# Patient Record
Sex: FEMALE | Race: WHITE | NOT HISPANIC OR LATINO | Employment: OTHER | URBAN - METROPOLITAN AREA
[De-identification: names, ages, dates, MRNs, and addresses within clinical notes are randomized per-mention and may not be internally consistent; named-entity substitution may affect disease eponyms.]

---

## 2017-01-07 ENCOUNTER — OFFICE VISIT - HEALTHEAST (OUTPATIENT)
Dept: FAMILY MEDICINE | Facility: CLINIC | Age: 67
End: 2017-01-07

## 2017-01-07 DIAGNOSIS — R09.82 POST-NASAL DRAINAGE: ICD-10-CM

## 2017-04-17 ENCOUNTER — RECORDS - HEALTHEAST (OUTPATIENT)
Dept: ADMINISTRATIVE | Facility: OTHER | Age: 67
End: 2017-04-17

## 2017-04-19 ENCOUNTER — OFFICE VISIT - HEALTHEAST (OUTPATIENT)
Dept: FAMILY MEDICINE | Facility: CLINIC | Age: 67
End: 2017-04-19

## 2017-04-19 ENCOUNTER — COMMUNICATION - HEALTHEAST (OUTPATIENT)
Dept: TELEHEALTH | Facility: CLINIC | Age: 67
End: 2017-04-19

## 2017-04-19 DIAGNOSIS — Z00.00 PREVENTATIVE HEALTH CARE: ICD-10-CM

## 2017-04-19 DIAGNOSIS — H81.10 BPPV (BENIGN PAROXYSMAL POSITIONAL VERTIGO), UNSPECIFIED LATERALITY: ICD-10-CM

## 2017-04-19 ASSESSMENT — MIFFLIN-ST. JEOR: SCORE: 1276.32

## 2017-04-19 NOTE — ASSESSMENT & PLAN NOTE
BPPV   Not currently feeling dizzy.  Recommend PT  Dizziness - Vestibular Therapist  Didier PT, DPT, NCS  Physical Therapist [facility]   Appointements: [phone] [address] [facility] [address]

## 2017-05-11 ENCOUNTER — COMMUNICATION - HEALTHEAST (OUTPATIENT)
Dept: FAMILY MEDICINE | Facility: CLINIC | Age: 67
End: 2017-05-11

## 2017-12-22 ENCOUNTER — RECORDS - HEALTHEAST (OUTPATIENT)
Dept: ADMINISTRATIVE | Facility: OTHER | Age: 67
End: 2017-12-22

## 2018-03-02 ENCOUNTER — OFFICE VISIT - HEALTHEAST (OUTPATIENT)
Dept: FAMILY MEDICINE | Facility: CLINIC | Age: 68
End: 2018-03-02

## 2018-03-02 DIAGNOSIS — N63.10 BREAST MASS, RIGHT: ICD-10-CM

## 2018-03-02 DIAGNOSIS — Z12.31 ENCOUNTER FOR SCREENING MAMMOGRAM FOR BREAST CANCER: ICD-10-CM

## 2018-03-02 DIAGNOSIS — Z12.4 SCREENING FOR CERVICAL CANCER: ICD-10-CM

## 2018-03-02 DIAGNOSIS — Z00.00 HEALTHCARE MAINTENANCE: ICD-10-CM

## 2018-03-02 DIAGNOSIS — Z91.89 AT HIGH RISK FOR OSTEOPOROSIS: ICD-10-CM

## 2018-03-02 LAB
ALBUMIN SERPL-MCNC: 3.8 G/DL (ref 3.5–5)
ALP SERPL-CCNC: 64 U/L (ref 45–120)
ALT SERPL W P-5'-P-CCNC: 19 U/L (ref 0–45)
ANION GAP SERPL CALCULATED.3IONS-SCNC: 9 MMOL/L (ref 5–18)
AST SERPL W P-5'-P-CCNC: 24 U/L (ref 0–40)
BILIRUB SERPL-MCNC: 0.6 MG/DL (ref 0–1)
BUN SERPL-MCNC: 16 MG/DL (ref 8–22)
CALCIUM SERPL-MCNC: 9.7 MG/DL (ref 8.5–10.5)
CHLORIDE BLD-SCNC: 105 MMOL/L (ref 98–107)
CHOLEST SERPL-MCNC: 204 MG/DL
CO2 SERPL-SCNC: 26 MMOL/L (ref 22–31)
CREAT SERPL-MCNC: 0.76 MG/DL (ref 0.6–1.1)
FASTING STATUS PATIENT QL REPORTED: YES
GFR SERPL CREATININE-BSD FRML MDRD: >60 ML/MIN/1.73M2
GLUCOSE BLD-MCNC: 88 MG/DL (ref 70–125)
HDLC SERPL-MCNC: 76 MG/DL
LDLC SERPL CALC-MCNC: 115 MG/DL
POTASSIUM BLD-SCNC: 4.1 MMOL/L (ref 3.5–5)
PROT SERPL-MCNC: 6.8 G/DL (ref 6–8)
SODIUM SERPL-SCNC: 140 MMOL/L (ref 136–145)
TRIGL SERPL-MCNC: 67 MG/DL
TSH SERPL DL<=0.005 MIU/L-ACNC: 2.14 UIU/ML (ref 0.3–5)

## 2018-03-02 ASSESSMENT — MIFFLIN-ST. JEOR: SCORE: 1262.71

## 2018-03-03 LAB — HCV AB SERPL QL IA: NEGATIVE

## 2018-03-05 LAB
25(OH)D3 SERPL-MCNC: 33.6 NG/ML (ref 30–80)
HPV SOURCE: NORMAL
HUMAN PAPILLOMA VIRUS 16 DNA: NEGATIVE
HUMAN PAPILLOMA VIRUS 18 DNA: NEGATIVE
HUMAN PAPILLOMA VIRUS FINAL DIAGNOSIS: NORMAL
HUMAN PAPILLOMA VIRUS OTHER HR: NEGATIVE
SPECIMEN DESCRIPTION: NORMAL

## 2018-03-06 ENCOUNTER — COMMUNICATION - HEALTHEAST (OUTPATIENT)
Dept: FAMILY MEDICINE | Facility: CLINIC | Age: 68
End: 2018-03-06

## 2018-03-13 LAB
BKR LAB AP ABNORMAL BLEEDING: NO
BKR LAB AP BIRTH CONTROL/HORMONES: NORMAL
BKR LAB AP CERVICAL APPEARANCE: NORMAL
BKR LAB AP GYN ADEQUACY: NORMAL
BKR LAB AP GYN INTERPRETATION: NORMAL
BKR LAB AP HPV REFLEX: NORMAL
BKR LAB AP LMP: NORMAL
BKR LAB AP PATIENT STATUS: NORMAL
BKR LAB AP PREVIOUS ABNORMAL: NORMAL
BKR LAB AP PREVIOUS NORMAL: 2012
HIGH RISK?: NO
PATH REPORT.COMMENTS IMP SPEC: NORMAL
RESULT FLAG (HE HISTORICAL CONVERSION): NORMAL

## 2018-06-13 ENCOUNTER — HOSPITAL ENCOUNTER (OUTPATIENT)
Dept: ULTRASOUND IMAGING | Facility: CLINIC | Age: 68
Discharge: HOME | End: 2018-06-13
Attending: FAMILY MEDICINE

## 2018-06-13 ENCOUNTER — HOSPITAL ENCOUNTER (OUTPATIENT)
Dept: MAMMOGRAPHY | Facility: CLINIC | Age: 68
Discharge: HOME | End: 2018-06-13
Attending: FAMILY MEDICINE

## 2018-06-13 DIAGNOSIS — N63.10 BREAST MASS, RIGHT: ICD-10-CM

## 2018-06-13 DIAGNOSIS — Z12.31 ENCOUNTER FOR SCREENING MAMMOGRAM FOR BREAST CANCER: ICD-10-CM

## 2018-06-13 DIAGNOSIS — N60.01 SOLITARY CYST OF RIGHT BREAST: ICD-10-CM

## 2019-04-12 ENCOUNTER — OFFICE VISIT - HEALTHEAST (OUTPATIENT)
Dept: FAMILY MEDICINE | Facility: CLINIC | Age: 69
End: 2019-04-12

## 2019-04-12 DIAGNOSIS — J00 NASOPHARYNGITIS ACUTE: ICD-10-CM

## 2019-04-12 RX ORDER — IBUPROFEN 200 MG
200 TABLET ORAL EVERY 6 HOURS PRN
Status: SHIPPED | COMMUNITY
Start: 2019-04-12

## 2019-05-01 ENCOUNTER — COMMUNICATION - HEALTHEAST (OUTPATIENT)
Dept: FAMILY MEDICINE | Facility: CLINIC | Age: 69
End: 2019-05-01

## 2019-07-22 ENCOUNTER — OFFICE VISIT - HEALTHEAST (OUTPATIENT)
Dept: FAMILY MEDICINE | Facility: CLINIC | Age: 69
End: 2019-07-22

## 2019-07-22 ENCOUNTER — HOSPITAL ENCOUNTER (OUTPATIENT)
Dept: MAMMOGRAPHY | Facility: CLINIC | Age: 69
Discharge: HOME | End: 2019-07-22
Attending: FAMILY MEDICINE

## 2019-07-22 DIAGNOSIS — R21 RASH AND NONSPECIFIC SKIN ERUPTION: ICD-10-CM

## 2019-07-22 DIAGNOSIS — Z12.31 VISIT FOR SCREENING MAMMOGRAM: ICD-10-CM

## 2019-07-22 RX ORDER — BETAMETHASONE DIPROPIONATE 0.5 MG/G
CREAM TOPICAL 2 TIMES DAILY
Qty: 30 G | Refills: 0 | Status: SHIPPED | OUTPATIENT
Start: 2019-07-22 | End: 2021-07-30

## 2019-09-03 ENCOUNTER — OFFICE VISIT - HEALTHEAST (OUTPATIENT)
Dept: FAMILY MEDICINE | Facility: CLINIC | Age: 69
End: 2019-09-03

## 2019-09-03 DIAGNOSIS — L43.9 LICHEN PLANUS: ICD-10-CM

## 2019-09-03 DIAGNOSIS — N81.11 CYSTOCELE, MIDLINE: ICD-10-CM

## 2019-09-03 DIAGNOSIS — N81.6 RECTOCELE: ICD-10-CM

## 2019-09-03 ASSESSMENT — MIFFLIN-ST. JEOR: SCORE: 1185.6

## 2019-09-26 ENCOUNTER — OFFICE VISIT - HEALTHEAST (OUTPATIENT)
Dept: FAMILY MEDICINE | Facility: CLINIC | Age: 69
End: 2019-09-26

## 2019-09-26 ENCOUNTER — AMBULATORY - HEALTHEAST (OUTPATIENT)
Dept: FAMILY MEDICINE | Facility: CLINIC | Age: 69
End: 2019-09-26

## 2019-09-26 DIAGNOSIS — Z00.00 HEALTHCARE MAINTENANCE: ICD-10-CM

## 2019-09-26 DIAGNOSIS — Z23 ENCOUNTER FOR IMMUNIZATION: ICD-10-CM

## 2019-09-26 DIAGNOSIS — Z78.0 ASYMPTOMATIC MENOPAUSAL STATE: ICD-10-CM

## 2019-09-26 DIAGNOSIS — Z60.4 SOCIAL ISOLATION: ICD-10-CM

## 2019-09-26 DIAGNOSIS — Z00.00 ROUTINE HEALTH MAINTENANCE: ICD-10-CM

## 2019-09-26 DIAGNOSIS — N81.9 FEMALE GENITAL PROLAPSE, UNSPECIFIED TYPE: ICD-10-CM

## 2019-09-26 DIAGNOSIS — E78.2 MIXED HYPERLIPIDEMIA: ICD-10-CM

## 2019-09-26 DIAGNOSIS — Z13.820 SCREENING FOR OSTEOPOROSIS: ICD-10-CM

## 2019-09-26 LAB
ALBUMIN SERPL-MCNC: 3.8 G/DL (ref 3.5–5)
ALP SERPL-CCNC: 62 U/L (ref 45–120)
ALT SERPL W P-5'-P-CCNC: 23 U/L (ref 0–45)
ANION GAP SERPL CALCULATED.3IONS-SCNC: 6 MMOL/L (ref 5–18)
AST SERPL W P-5'-P-CCNC: 22 U/L (ref 0–40)
BILIRUB SERPL-MCNC: 0.5 MG/DL (ref 0–1)
BUN SERPL-MCNC: 21 MG/DL (ref 8–28)
CALCIUM SERPL-MCNC: 9.7 MG/DL (ref 8.5–10.5)
CHLORIDE BLD-SCNC: 106 MMOL/L (ref 98–107)
CHOLEST SERPL-MCNC: 235 MG/DL
CO2 SERPL-SCNC: 28 MMOL/L (ref 22–31)
CREAT SERPL-MCNC: 0.87 MG/DL (ref 0.6–1.1)
FASTING STATUS PATIENT QL REPORTED: YES
GFR SERPL CREATININE-BSD FRML MDRD: >60 ML/MIN/1.73M2
GLUCOSE BLD-MCNC: 91 MG/DL (ref 70–125)
HDLC SERPL-MCNC: 99 MG/DL
LDLC SERPL CALC-MCNC: 127 MG/DL
POTASSIUM BLD-SCNC: 4.6 MMOL/L (ref 3.5–5)
PROT SERPL-MCNC: 6.6 G/DL (ref 6–8)
SODIUM SERPL-SCNC: 140 MMOL/L (ref 136–145)
TRIGL SERPL-MCNC: 44 MG/DL

## 2019-09-26 RX ORDER — ATORVASTATIN CALCIUM 40 MG/1
40 TABLET, FILM COATED ORAL AT BEDTIME
Qty: 90 TABLET | Refills: 3 | Status: SHIPPED | OUTPATIENT
Start: 2019-09-26 | End: 2021-11-16

## 2019-09-26 SDOH — SOCIAL STABILITY - SOCIAL INSECURITY: SOCIAL EXCLUSION AND REJECTION: Z60.4

## 2019-09-26 ASSESSMENT — MIFFLIN-ST. JEOR: SCORE: 1172.55

## 2019-09-27 ENCOUNTER — COMMUNICATION - HEALTHEAST (OUTPATIENT)
Dept: FAMILY MEDICINE | Facility: CLINIC | Age: 69
End: 2019-09-27

## 2019-09-27 DIAGNOSIS — E78.2 MIXED HYPERLIPIDEMIA: ICD-10-CM

## 2019-09-30 ENCOUNTER — RECORDS - HEALTHEAST (OUTPATIENT)
Dept: ADMINISTRATIVE | Facility: OTHER | Age: 69
End: 2019-09-30

## 2019-11-18 ENCOUNTER — COMMUNICATION - HEALTHEAST (OUTPATIENT)
Dept: FAMILY MEDICINE | Facility: CLINIC | Age: 69
End: 2019-11-18

## 2019-12-02 ENCOUNTER — COMMUNICATION - HEALTHEAST (OUTPATIENT)
Dept: FAMILY MEDICINE | Facility: CLINIC | Age: 69
End: 2019-12-02

## 2020-11-18 ENCOUNTER — RECORDS - HEALTHEAST (OUTPATIENT)
Dept: ADMINISTRATIVE | Facility: OTHER | Age: 70
End: 2020-11-18

## 2021-01-07 ENCOUNTER — HOSPITAL ENCOUNTER (OUTPATIENT)
Dept: MAMMOGRAPHY | Facility: CLINIC | Age: 71
Discharge: HOME | End: 2021-01-07
Attending: FAMILY MEDICINE

## 2021-01-07 DIAGNOSIS — Z12.31 VISIT FOR SCREENING MAMMOGRAM: ICD-10-CM

## 2021-02-04 ENCOUNTER — AMBULATORY - HEALTHEAST (OUTPATIENT)
Dept: OTHER | Facility: CLINIC | Age: 71
End: 2021-02-04

## 2021-02-04 ENCOUNTER — DOCUMENTATION ONLY (OUTPATIENT)
Dept: OTHER | Facility: CLINIC | Age: 71
End: 2021-02-04

## 2021-05-27 NOTE — PATIENT INSTRUCTIONS - HE
1.  The encounter diagnosis is viral nasopharyngitis  2.  Symptomatic treatment would include acetaminophen for fever or pain, guaifenesin and dextromethorphan for cough.  3.  I would expect the infection to resolve in the next week.  4.  Return to the walk-in clinic if symptoms become worse or fail to improve.

## 2021-05-27 NOTE — PROGRESS NOTES
Subjective:      Patient ID: Benita Clifton is a 68 y.o. female.    Chief Complaint:    Cough   This is a new problem. The current episode started 1 to 4 weeks ago. The problem occurs intermittently. The problem has been gradually improving. Associated symptoms include congestion and coughing. She has tried acetaminophen for the symptoms. The treatment provided moderate relief.         No past medical history on file.    No past surgical history on file.    Family History   Problem Relation Age of Onset     Heart disease Mother      Stroke Father      Breast cancer Maternal Aunt        Social History     Tobacco Use     Smoking status: Never Smoker     Smokeless tobacco: Never Used   Substance Use Topics     Alcohol use: No     Drug use: No       Review of Systems   HENT: Positive for congestion.    Respiratory: Positive for cough.    All other systems reviewed and are negative.      Objective:     /65 (Patient Site: Right Arm, Patient Position: Sitting)   Pulse 67   Temp 97.9  F (36.6  C)   Resp 17   Wt 151 lb (68.5 kg)   LMP  (LMP Unknown)   SpO2 97%   Breastfeeding? No   BMI 23.30 kg/m      Physical Exam   Constitutional: She is oriented to person, place, and time. She appears well-developed and well-nourished. No distress.   HENT:   Head: Normocephalic.   Right Ear: External ear normal.   Left Ear: External ear normal.   Nose: Nose normal.   Mouth/Throat: Oropharynx is clear and moist. No oropharyngeal exudate.   Eyes: Pupils are equal, round, and reactive to light. Conjunctivae and EOM are normal. Right eye exhibits no discharge. Left eye exhibits no discharge.   Neck: Normal range of motion. Neck supple.   Cardiovascular: Normal rate, regular rhythm and normal heart sounds.   Pulmonary/Chest: Effort normal and breath sounds normal. No respiratory distress. She has no wheezes. She has no rales.   Lymphadenopathy:     She has no cervical adenopathy.   Neurological: She is alert and oriented  to person, place, and time.   Skin: Skin is warm and dry. No rash noted. She is not diaphoretic.   Psychiatric: She has a normal mood and affect. Her behavior is normal. Judgment and thought content normal.   Nursing note and vitals reviewed.      Assessment:       The encounter diagnosis was Nasopharyngitis acute.    Plan:     1.  The encounter diagnosis is viral nasopharyngitis  2.  Symptomatic treatment would include acetaminophen for fever or pain, guaifenesin and dextromethorphan for cough.  3.  I would expect the infection to resolve in the next week.  4.  Return to the walk-in clinic if symptoms become worse or fail to improve.

## 2021-05-28 NOTE — TELEPHONE ENCOUNTER
Question following Office Visit  When did you see your provider: 4/12/19  What is your question: Patient stated she still has the same symptoms from this visit. Patient denied a fever. Patient stated she still has the nasal congestion and headache. Patient is questioning if she can get an antibiotic because she is still dealing with this cold.    Pharmacy:  [facility]  Okay to leave a detailed message: Yes  [phone]

## 2021-05-30 VITALS — HEIGHT: 68 IN | BODY MASS INDEX: 24.25 KG/M2 | WEIGHT: 160 LBS

## 2021-05-30 VITALS — WEIGHT: 163.1 LBS

## 2021-05-30 NOTE — PATIENT INSTRUCTIONS - HE
1.  Follow-up with your  dermatologist for possible biopsy of these skin lesions.  They may be lichen planus also, so I will be treating this topically with a high potency steroid.  2.  Follow-up if you develop any pus, growing redness, or worsening of your symptoms.

## 2021-05-31 NOTE — PROGRESS NOTES
Assessment/Plan:     Patient presents to clinic with skin lesions already seen by dermatologist with a diagnosis of lichen planus.  It is unclear if patient has lichen planus inside her oral mucosa as well, but has seen an oral surgeon who has already taken biopsies.  I encouraged her to follow-up.  No changes in treatment today.    For her pelvic organ prolapse, patient has an obvious cystocele and rectocele.  I am uncertain if patient has uterine prolapse as well.  Recommended that patient see a surgeon if she is interested in definitive therapy.  Patient would like to think about it.  We also discussed pessaries as an option.  Handout was provided to patient on more information.  Patient was encouraged to return in 1 month for her physical.    Problem List Items Addressed This Visit     None      Visit Diagnoses     Cystocele, midline    -  Primary    Rectocele        Lichen planus              Return to clinic in 1 month for physical.     Total time spent with patient was 15 minutes with greater than 50% spent in face-to-face counseling regarding the above plan.    Subjective:      Benita Clifton is a 69 y.o. female who presents to clinic for uterus concerns.    Patient has been having some mild discomfort in the vaginal area without evidence of urinary tract infection or any burning with urination.  She denies vaginal discharge.  She wonders if she is having a pelvic organ prolapse.  She was pregnant 4 times and did deliver all children vaginally.  Her largest child was 9 pounds 13 ounces.  She is not having any pain with intercourse, no difficulty with urination or incomplete voiding, and no fecal impaction necessitating digital assistance.    She also notes that she has skin lesions appreciated on her back and arms.  She has seen a dermatologist.  It was diagnosed as lichen planus.  She was told to treat with betamethasone but only sparingly.    Patient also has a history of lesions inside her mouth.  " She was told that also is likely lichen planus but had a biopsy who is results have not yet been revealed to her.  She is hesitant about returning to the clinic given the parking situation.      Past Medical History, Family History, and Social History reviewed.     Current Outpatient Medications on File Prior to Visit   Medication Sig Dispense Refill     acetaminophen (TYLENOL ORAL) Take by mouth.       aspirin 81 MG EC tablet Take 1 tablet (81 mg total) by mouth daily. 150 tablet 2     betamethasone, augmented, (DIPROLENE-AF) 0.05 % cream Apply topically 2 (two) times a day. 30 g 0     CALCIUM-MAGNESIUM-ZINC ORAL Take by mouth.       ibuprofen (ADVIL) 200 MG tablet Take 200 mg by mouth every 6 (six) hours as needed for pain.       MULTIVITAMIN ORAL Take by mouth.       No current facility-administered medications on file prior to visit.        Review of systems is as stated in HPI.  The remainder of the 10 system review is otherwise negative.    Objective:     /74   Pulse 78   Ht 5' 7.5\" (1.715 m)   Wt 140 lb (63.5 kg)   LMP  (LMP Unknown)   SpO2 98%   BMI 21.60 kg/m   Body mass index is 21.6 kg/m .    Gen: Alert, NAD, appears stated age, normal hygiene   ENT/mouth: nares clear, septum midline, absent rhinorrhea, white patchy lesions on the buccal mucosa bilaterally  SKIN: erythematous and scaly lesions appreciated on forearms and back in various sized patches  : obvious rectocele and cystocele without irritation or discharge    This note has been dictated using voice recognition software. Any grammatical or context distortions are unintentional and inherent to the the software.     Roz Garcia MD      "

## 2021-06-01 VITALS — HEIGHT: 68 IN | BODY MASS INDEX: 23.79 KG/M2 | WEIGHT: 157 LBS

## 2021-06-01 NOTE — PROGRESS NOTES
Assessment and Plan:   Patient presents to clinic for her annual wellness visit.  We had an extensive discussion about social isolation and I recommended that she look into classes nearby, learning a new skill, volunteering, or getting involved with her Orthodoxy.  She is amenable to this plan.  Her PHQ 2 was within normal limits, I do not suspect clinical depression at this time.  We also discussed pelvic organ prolapse.  She would like to be evaluated by a surgeon to at least discuss options.      USPSTF Recommendations for age 69:  Patient has been counseled on/screened for:  - intimate partner violence and there are no concerns at this time  - a healthful diet and physical activity for CVD disease prevention  - Diabetes and hyperlipidemia: screening was performed.   - Hep C, negative in 2018  - Asprin use: patient chose to discontinue   Sexually transmitted infections: Patient would not like to be screened for chlamydia, gonorrhea, syphilis, HIV, and hepatitis  Colorectal Cancer: Colonoscopy last performed in 2017, due in 2022  Immunizations: up to date except for shingles, influenza, pneumonia which was recommended  Cervical Cancer Screening: patient has been screened for cervical cancer per protocol in 2018, results were negative with negative HPV  Breast Cancer: Last mammogram July 2019  Bone Density: Dexa scan ordered today    The patient's current medical problems were reviewed.    The following health maintenance schedule was reviewed with the patient and provided in printed form in the after visit summary:   Health Maintenance   Topic Date Due     ZOSTER VACCINES (2 of 3) 08/09/2012     PNEUMOCOCCAL IMMUNIZATION 65+ LOW/MEDIUM RISK (1 of 2 - PCV13) 08/13/2013     DXA SCAN  08/13/2013     MEDICARE ANNUAL WELLNESS VISIT  03/02/2019     INFLUENZA VACCINE RULE BASED (1) 08/01/2019     FALL RISK ASSESSMENT  09/26/2020     MAMMOGRAM  07/22/2021     ADVANCE CARE PLANNING  03/02/2023     TD 18+ HE  10/25/2027      COLONOSCOPY  12/22/2027     HEPATITIS C SCREENING  Completed        Subjective:   Chief Complaint: Benita Clifton is an 69 y.o. female here for an Annual Wellness visit.   HPI:    1. Patient feels depressed slightly sometimes. She works part time. She likes to be up doing things. She feels isolated sometimes. She has no close female friends. She wants to volunteer more.  She would like to have close friends where she does not always need to be the one initiating contact.  She worries about losing her  and losing her only support system.  She is interested in getting involved in her Hindu.  2.  Patient has known pelvic organ prolapse.  It does not bother her on a daily basis but it does interfere with intercourse.  She does not notice any irritation or discharge.    Review of Systems:  Please see above.  The rest of the review of systems are negative for all systems.    Patient Care Team:  Roz Garcia MD as PCP - General (Family Medicine)  Roz Garcia MD as Assigned PCP     Patient Active Problem List   Diagnosis     BPPV (benign paroxysmal positional vertigo)     History reviewed. No pertinent past medical history.   History reviewed. No pertinent surgical history.   Family History   Problem Relation Age of Onset     Heart disease Mother      Stroke Father      Breast cancer Maternal Aunt       Social History     Socioeconomic History     Marital status:      Spouse name: Not on file     Number of children: Not on file     Years of education: Not on file     Highest education level: Not on file   Occupational History     Occupation: lpn   Social Needs     Financial resource strain: Not on file     Food insecurity:     Worry: Not on file     Inability: Not on file     Transportation needs:     Medical: Not on file     Non-medical: Not on file   Tobacco Use     Smoking status: Never Smoker     Smokeless tobacco: Never Used   Substance and Sexual Activity     Alcohol use: No  "    Drug use: No     Sexual activity: Yes   Lifestyle     Physical activity:     Days per week: Not on file     Minutes per session: Not on file     Stress: Not on file   Relationships     Social connections:     Talks on phone: Not on file     Gets together: Not on file     Attends Druze service: Not on file     Active member of club or organization: Not on file     Attends meetings of clubs or organizations: Not on file     Relationship status: Not on file     Intimate partner violence:     Fear of current or ex partner: Not on file     Emotionally abused: Not on file     Physically abused: Not on file     Forced sexual activity: Not on file   Other Topics Concern     Not on file   Social History Narrative    Has grand kids. Likes to read. Works with elderly as LPN.      Current Outpatient Medications   Medication Sig Dispense Refill     acetaminophen (TYLENOL ORAL) Take by mouth.       betamethasone, augmented, (DIPROLENE-AF) 0.05 % cream Apply topically 2 (two) times a day. 30 g 0     CALCIUM-MAGNESIUM-ZINC ORAL Take by mouth.       ibuprofen (ADVIL) 200 MG tablet Take 200 mg by mouth every 6 (six) hours as needed for pain.       MULTIVITAMIN ORAL Take by mouth.       No current facility-administered medications for this visit.       Objective:   Vital Signs:   Visit Vitals  /70   Pulse 60   Ht 5' 7.25\" (1.708 m)   Wt 138 lb (62.6 kg)   LMP  (LMP Unknown)   SpO2 99%   BMI 21.45 kg/m         VisionScreening:  No exam data present     PHYSICAL EXAM  Gen: Alert, NAD, appears stated age, normal hygiene   Eyes: conjunctivae without injection, sclera clear, EOMI  ENT/mouth: nares clear, septum midline, absent rhinorrhea, throat without injection, neck is supple, no thyroid enlargement  CV: RRR, no murmur appreciated, pedal edema absent bilaterally  Resp: CTAB, no wheezes, rales or ronchi  ABD: normoactive, non-tender to palpation, nondistended  MSK: grossly full range of motion in all joints, no obvious " deformity  Neuro: CN II-XII grossly intact, no deficits in coordination  Psych: no apparent hallucinations or delusions, no pressured speech; alert, oriented x3  SKIN: dry and without lesions  Heme/lymph: no pallor, no active bleeding/bruising, no adenopathy appreciated  :  - external genitalia: normal appearance, no lesions, no flattening of the anatomic structures  - urethral meatus: without prolapse, no obvious irritation  - vagina: expected resilience given estrogen status, pelvic organ prolapse appreciated, normal discharge  - cervix: nontender  - uterus: anteverted  - anus and perineum: normal and without lesions  Breast:  - absent nipple discharge, no masses appreciated, nontender to palpation        Assessment Results 9/26/2019   Activities of Daily Living No help needed   Instrumental Activities of Daily Living No help needed   Get Up and Go Score -   Mini Cog Total Score 5   Some recent data might be hidden     A Mini-Cog score of 0-2 suggests the possibility of dementia, score of 3-5 suggests no dementia    Identified Health Risks:

## 2021-06-01 NOTE — TELEPHONE ENCOUNTER
The 10-year ASCVD risk score (Ruben ALEXANDER Jr., et al., 2013) is: 7.6%    Values used to calculate the score:      Age: 69 years      Sex: Female      Is Non- : No      Diabetic: No      Tobacco smoker: No      Systolic Blood Pressure: 110 mmHg      Is BP treated: No      HDL Cholesterol: 99 mg/dL      Total Cholesterol: 235 mg/dL     Called patient and we had a long discussion about cholesterol. Patient will try psyllium.

## 2021-06-02 VITALS — WEIGHT: 151 LBS | BODY MASS INDEX: 23.3 KG/M2

## 2021-06-03 VITALS
WEIGHT: 140 LBS | SYSTOLIC BLOOD PRESSURE: 114 MMHG | DIASTOLIC BLOOD PRESSURE: 74 MMHG | HEART RATE: 78 BPM | HEIGHT: 68 IN | BODY MASS INDEX: 21.22 KG/M2 | OXYGEN SATURATION: 98 %

## 2021-06-03 VITALS
HEART RATE: 60 BPM | BODY MASS INDEX: 21.66 KG/M2 | OXYGEN SATURATION: 99 % | HEIGHT: 67 IN | WEIGHT: 138 LBS | SYSTOLIC BLOOD PRESSURE: 110 MMHG | DIASTOLIC BLOOD PRESSURE: 70 MMHG

## 2021-06-03 VITALS — WEIGHT: 144 LBS | BODY MASS INDEX: 22.22 KG/M2

## 2021-06-03 NOTE — TELEPHONE ENCOUNTER
New Appointment Needed  What is the reason for the visit:  Pre Op physical   Pre-Op Appt Request  When is the surgery? :  12/5/19  Where is the surgery?:   [facility]   Who is the surgeon? :  Dr. Luis Miguel Duran   What type of surgery is being done?:Nimo  Provider Preference: PCP only  How soon do you need to be seen?: before 12/5   Waitlist offered?: No  Okay to leave a detailed message:  Yes    
Unsure why this wasn't scheduled at time of call as Dr. Garcia has openings before surgery. Pt schedule for 11/26  
9

## 2021-06-06 ENCOUNTER — HEALTH MAINTENANCE LETTER (OUTPATIENT)
Age: 71
End: 2021-06-06

## 2021-06-08 NOTE — PROGRESS NOTES
Chief Complaint   Patient presents with     Nasal Congestion     constant drainage with coughing - 4 weeks     Fatigue     4 weeks     Sinusitis     4 weeks - if rx given please print for patient     Sore Throat     intermittent due to drainage       HPI:    Patient is here for 4 wks of post-nasal drainage causing intermittent productive cough. She reported feeling fatigue but denied fever, chills, chest pain, shortness of breath. No facial pressure nor sinus pain.     ROS: Pertinent ROS noted in HPI.     No Known Allergies    There is no problem list on file for this patient.      No family history on file.    Social History     Social History     Marital status:      Spouse name: N/A     Number of children: N/A     Years of education: N/A     Occupational History     Not on file.     Social History Main Topics     Smoking status: Not on file     Smokeless tobacco: Not on file     Alcohol use Not on file     Drug use: Not on file     Sexual activity: Not on file     Other Topics Concern     Not on file     Social History Narrative         Objective:    Vitals:    01/07/17 0827   BP: 102/60   Pulse: 60   Temp: 98  F (36.7  C)   SpO2: 98%       Gen:NAD  Throat: normal  Ears: Normal TMs and canals  Nose: no discharge, unremarkable nasal mucosa  Sinus: no tenderness  CV: RRR, no M, R, G  Pulm: CTAB, normal effort    Post-nasal drainage  -     fluticasone (FLONASE) 50 mcg/actuation nasal spray; 2 sprays into each nostril daily.

## 2021-06-10 NOTE — PROGRESS NOTES
"ASSESSMENT AND PLAN:  BPPV   Not currently feeling dizzy.  Recommend PT  Dizziness - Vestibular Therapist  Jose Martin Brown, PT, DPT, NCS  Physical Therapist [facility]   Appointements: [phone]   [facility] [address]     Chief Complaint   Patient presents with     Establish Care     Transferring care from [facility] to here. She gets vertigo at times when changing positions. States it comes and goes.  She wants to see a physical therapist.        SUBJECTIVE: Benita Clifton is a 66 y.o. female complains of dizziness intermittently for the past two years.     Description of dizzyness: she notices that it is very positional and is described as a spinning sensation (not a lightheaded sensation. She notices it is worst with tipping head forward or backward. She does do yoga and can avoid it by modifying head positions and being careful.  Hx of loss of consiousness  none    Onset - abruptly comes and goes. Lasts less than 20 seconds each time.   Course - seems to improve, but is persistent.   Duration and daily pattern - noted with head positions.   Factors that alleviate or exacerbate symptoms being careful about head positions.     NONSMOKER    Review of Systems   Constitutional: Negative.    HENT: Negative.    Eyes: Negative.    Respiratory: Negative.    Cardiovascular: Negative.    Gastrointestinal: Negative.    Endocrine: Negative.    Genitourinary: Negative.    Musculoskeletal: Negative.    Skin: Negative.    Neurological: Negative.         No brainstem signs of dizzyness such as:  no staggering or ataxic gait, vomiting, headache, double vision, visual loss, slurred speech, numbness of the face or body, weakness, clumsiness, or incoordination    Hematological: Negative.    Psychiatric/Behavioral: Negative.           OBJECTIVE:/64  Pulse 64  Resp 12  Ht 5' 7.5\" (1.715 m)  Wt 160 lb (72.6 kg)  LMP  (LMP Unknown)  Breastfeeding? No  BMI 24.69 kg/m2   Physical Exam "   Constitutional: She is oriented to person, place, and time. She appears well-developed and well-nourished.   HENT:   Head: Normocephalic and atraumatic.   Eyes: Conjunctivae are normal.   Cardiovascular: Normal rate and regular rhythm.    Pulmonary/Chest: Effort normal.   Neurological: She is alert and oriented to person, place, and time.   Neuro exam is normal including gait, rapidly alternating movements, extraocular movements are intact, pupils are equally round and reactive to light and accommodation, there is no arm drift, she is able to stand on 1 foot at a time without losing balance, finger to nose is normal.  Coordination is normal.  Sensation is symmetrical bilaterally. facial symmetry is normal.  Normal Romberg exam.  Normal heel-to-shin.    Skin: Skin is warm and dry.   Psychiatric: She has a normal mood and affect.

## 2021-06-15 PROBLEM — H81.10 BPPV (BENIGN PAROXYSMAL POSITIONAL VERTIGO): Status: ACTIVE | Noted: 2017-04-19

## 2021-06-16 NOTE — PROGRESS NOTES
Assessment:      Annual Wellness Visit    Patient presents to clinic for annual wellness visit and had no concerns.  We discussed all of the healthcare maintenance that she would allow to be ordered, decided to do a Pap smear as her last one was prior to her turning 63 and I did not see HPV data.  She also is nearly due for screening mammogram but with the increased nodularity appreciated on the right breast chose to do it as a diagnostic and order it promptly.     Plan:      Healthcare Maintenance:  - patient has been screened for cervical cancer per protocol in 2012, results were normal  - discussed intimate partner violence and there are no concerns at this time  - aspirin to prevent CVD, recommended in women 55-79 to prevent ischemic strokes  - colorectal cancer screening via colonoscopy was performed in December 2017, needs repeat in 2022  - discussed healthful diet and physical activity for CVD disease prevention  - lipid and DM II screening was performed  - Mammogram last performed 4/2017, will be due in two months      Problem List Items Addressed This Visit     None      Visit Diagnoses     Healthcare maintenance    -  Primary    Relevant Orders    Lipid [place] FASTING    Comprehensive Metabolic Panel    Thyroid Stimulating Hormone (TSH)    Hepatitis C Antibody (Anti-HCV)    Pneumococcal conjugate vaccine 13-valent 6wks-17yrs; >50yrs    Vitamin D, Total (25-Hydroxy)    At high risk for osteoporosis        Relevant Orders    Vitamin D, Total (25-Hydroxy)    Screening for cervical cancer        Relevant Orders    Gynecologic Cytology (PAP Smear)    Encounter for screening mammogram for breast cancer        Relevant Orders    Mammo Diagnostic Bilateral    US Breast Limited (Focal) Right    Breast mass, right        Relevant Orders    US Breast Limited (Focal) Right            Subjective:      Benita Clifton is a 67 y.o. male who presents for a Subsequent Annual Wellness Visit.      Leg cramps that  resolve with a multivitamin.     Healthy Habits:   Regular Exercise: Yes  Sunscreen Use: Yes  Healthy Diet: Yes  Dental Visits Regularly: Yes  Seat Belt: Yes  Sexually active: Yes  Monthly Self Testicular Exams:  N/A  Hemoccults: N/A  Flex Sig: N/A  Colonoscopy: patient is not due  Lipid Profile: due today  Glucose Screen: patient is due toda  Prevention of Osteoporosis: multivitamin but no specific vit D supplement   Last Dexa: last one done 2016  Guns at Home:  No      Immunization History   Administered Date(s) Administered     Hep A, Adult IM (19yr & older) 08/24/2009, 05/21/2010     Hepatitis A, Peds, Unspecified 08/24/2009, 05/21/2010     Influenza,seasonal, Inj IIV3 08/31/2009     MMR 08/24/2009     PPD Test 06/28/2005, 07/18/2007, 05/21/2010     Td, Adult, Absorbed 09/10/1997     Tdap 07/18/2007, 10/25/2017     Varicella 08/24/2009     Yellow Fever 08/31/2009     ZOSTER, LIVE 06/14/2012     Immunization status: needs pneumonia vaccine today, declines influenza today.    Current Outpatient Prescriptions   Medication Sig Dispense Refill     CALCIUM-MAGNESIUM-ZINC ORAL Take by mouth.       MULTIVITAMIN ORAL Take by mouth.       aspirin 81 MG EC tablet Take 1 tablet (81 mg total) by mouth daily. 150 tablet 2     No current facility-administered medications for this visit.      History reviewed. No pertinent past medical history.  History reviewed. No pertinent surgical history.  Review of patient's allergies indicates no known allergies.  Family History   Problem Relation Age of Onset     Heart disease Mother      Stroke Father      Breast cancer Maternal Aunt      Social History     Social History     Marital status:      Spouse name: N/A     Number of children: N/A     Years of education: N/A     Occupational History     lpn      Social History Main Topics     Smoking status: Never Smoker     Smokeless tobacco: Never Used     Alcohol use No     Drug use: No     Sexual activity: Yes     Other Topics  "Concern     Not on file     Social History Narrative    Has grand kids. Likes to read. Works with elderly as LPN.       Current Diet:  well balanced diet  Amount Consumed Per Day  good vegetable content    Exercise Type: walking and bicycling  Exercise Frequency: Daily exercise    Mood Disorder and Cognitive Impairment Screenings  Anxiety Screening Tool:  CARLOS 2      Anxiety Screening Tool Score:  0  Depression Screening Tool:  PHQ 2    Depression Screening Tool Score:  0  Cognitive Impairment and Additional Screening:  No difficulty.    Functional Ability/Level of Safety  Fall Risk Factors:  previous fall and bppv  Home Safety Risk Factors: no grab bars, bathroom    Advanced Directive:  I have had an Advanced Directive discussion with the patient.    Co-Managers and Medical Equipment/Suppliers:    PCP: Roz Garcia    Old records reviewed:  ED visit 10/25/17, fall  Progress note 4/19/17, for dizziness/vertigo  Progress note 1/7/17, urgent care for post-nasal drainage    Review of Systems  Review of Systems      None aside from described above    Objective:     Vitals:    03/02/18 0820   BP: 120/82   Pulse: (!) 53   SpO2: 99%   Weight: 157 lb (71.2 kg)   Height: 5' 7.5\" (1.715 m)            Gen: Alert, NAD, appears stated age, normal hygiene   Eyes: conjunctivae without injection, sclera clear, EOMI  ENT/mouth: nares clear, septum midline, absent rhinorrhea, throat without injection, neck is supple, no thyroid enlargement  CV: RRR, no murmur appreciated, pedal edema absent bilaterally  Resp: CTAB, no wheezes, rales or ronchi  Breast: No nipple discharge, nontender to palpation, some increased nodular masses most likely mammary glands in the right inferior breast distal to the nipple  ABD: normoactive, non-tender to palpation, nondistended  MSK: grossly full range of motion in all joints, no obvious deformity  Neuro: CN II-XII grossly intact, no deficits in coordination  Psych: no apparent hallucinations or " delusions, no pressured speech; alert, oriented x3  SKIN: dry and without lesions  Heme/lymph: no pallor, no active bleeding/bruising, no adenopathy appreciated  : No discharge, normal-appearing cervix, normal external anatomy

## 2021-06-17 NOTE — PATIENT INSTRUCTIONS - HE
Patient Instructions by Roz Garcia MD at 9/3/2019  8:10 AM     Author: Roz Garcia MD Service: -- Author Type: Physician    Filed: 9/3/2019  8:31 AM Encounter Date: 9/3/2019 Status: Signed    : Roz Garcia MD (Physician)         Patient Education     Understanding Cystocele (Prolapsed Bladder)  A cystocele is when a womans bladder sags down into the vagina. It does this when the wall of tissue between the bladder and the vagina gets weak. Its also called a prolapsed bladder. The sagging bladder can stretch the opening of the urethra. This is the tube that carries urine out of the body. This can cause urine to leak when you cough, sneeze, or lift something heavy. A cystocele can also cause discomfort in the pelvis and make it hard to fully empty your bladder.  The risk of cystocele is greater for women who have had vaginal deliveries.  Causes of a cystocele  A cystocele may be caused by:    Heavy lifting    Straining muscles during childbirth    Chronic constipation    Repeated straining during bowel movements or with coughing    Weak muscles around the vagina caused by lack of estrogen after menopause    Obesity    Aging    Previous pelvic surgery  Symptoms of a cystocele  Symptoms of a cystocele include:    Leakage of urine when you cough, sneeze, or lift something heavy    Heavy, achy, or full feeling in the pelvis    Pelvic pressure that gets worse with standing, lifting, or coughing    A bulge in the vagina that you can feel    Lower back pain    Sexual difficulties    Problems with inserting tampons    Frequent urination or the urge to pass urine    Incomplete emptying of the bladder    Trouble starting a stream of urine  Diagnosis of a cystocele  Your healthcare provider will ask about your medical history and give you a physical exam. Your doctor may also look in your bladder with a camera (cystoscopy), bladder function testing (urodynamics), X-rays, ultrasound, and  MRI.  A cystocele is graded during diagnosis. Grade 1 means the bladder sags only a short way into the top of the vagina. Grade 2 means the bladder sags down to the lower opening of the vagina. Grade 3 means the bladder sags out of the lower opening of the vagina.  Treatment of a cystocele  Treatment depends on the grade of your cystocele and other factors. Your choices may include:    Change of activity. You may need to avoid certain activities, such as heavy lifting or straining, that can cause your cystocele to get worse.    Pessary. This is a device put in the vagina to hold the bladder in place.    Surgery. A procedure can be done to move the bladder back into a more normal position and hold it in place.    Estrogen replacement therapy. This may help to strengthen the muscles around the vagina and bladder. Talk with your healthcare provider about the risks and benefits of hormone therapy based on your medical history.  Date Last Reviewed: 11/1/2017 2000-2017 [employer]. [address] [place] [address]. All rights reserved. This information is not intended as a substitute for professional medical care. Always follow your healthcare professional's instructions.           Patient Education     Pelvic Organ Prolapse: Surgery for Cystocele  Cystocele occurs when the bladder sags (prolapses) into the vagina. The goal of surgery is to repair the problem. This will help relieve your symptoms. Your surgery may include one or more repairs.     Cystocele         Anterior Repair         Incision made in the vaginal wall       Abdominal incisions      The surgical procedure  Cystocele can be treated with surgery done through the vagina. The sagging bladder is moved back into its normal position. Sutures (stitches) are placed in tissue between the bladder and the vagina. This helps hold the bladder in place. In some cases, another type of surgery is done. It can help correct weakness in the front  wall of the vagina. The vagina is attached to strong tissues in the side wall of the pelvis.  Your incisions  During surgery, the doctor will reach your pelvic organs through the vagina or the abdomen. An incision may be made in the vaginal wall. Surgery through the abdomen may be done with a single incision made up and down (vertically) or across (transverse), or through several small incisions (called laparoscopy).  Possible risks and complications of this surgery    Infection    Bleeding    Risks of anesthesia    Damage to nerves, muscles, or nearby pelvic structures    Blood clots    Prolapse of the pelvic organ or organs occurring again   Date Last Reviewed: 8/1/2017 2000-2017 [employer]. [address] [place] [id number]. All rights reserved. This information is not intended as a substitute for professional medical care. Always follow your healthcare professional's instructions.

## 2021-06-27 NOTE — PROGRESS NOTES
Progress Notes by Nehal Alexandra PA-C at 7/22/2019  7:50 AM     Author: Nehal Alexandra PA-C Service: -- Author Type: Physician Assistant    Filed: 7/22/2019  8:34 AM Encounter Date: 7/22/2019 Status: Signed    : Nehal Alexandra PA-C (Physician Assistant)       Chief Complaint   Patient presents with   ? poss rash/Bumps     [id number]  rash on back very itching        HPI:  Benita Clifton is a 68 y.o. female who presents today complaining of itching rash on the back x 3 weeks.  She states that the rash is not been changing significantly since when it developed.  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  She has not been feeling unwell.  She denies any new products, medications, foods, recent swimming, or known bug bites.  No one else in her family is experiencing similar rashes.  She does not have any pets.  She is tried topical Benadryl ointment which has not been effective.  She has been washing her back more frequently in hopes to improve her condition.  He has a follow-up appointment with her dermatologist on 8/2/2019.    History obtained from the patient.    Problem List:  2017-04: BPPV (benign paroxysmal positional vertigo)  2017-04: Preventative health care      No past medical history on file.    Social History     Tobacco Use   ? Smoking status: Never Smoker   ? Smokeless tobacco: Never Used   Substance Use Topics   ? Alcohol use: No       Review of Systems   Constitutional: Negative for chills and fever.   Skin: Positive for rash.       Vitals:    07/22/19 0758   BP: 104/63   Pulse: (!) 55   Resp: 18   Temp: 98  F (36.7  C)   TempSrc: Oral   SpO2: 98%   Weight: 144 lb (65.3 kg)       Physical Exam   Constitutional: She appears well-developed and well-nourished. No distress.   HENT:   Head: Normocephalic and atraumatic.   Right Ear: External ear normal.   Left Ear: External ear normal.   Eyes: Conjunctivae are normal. Right eye  exhibits no discharge. Left eye exhibits no discharge.   Pulmonary/Chest: Effort normal. No respiratory distress.   Skin: Rash noted. She is not diaphoretic.   Plaque-like scaling rash with mildly erythematous base present on the back diffusely.  Resembles a [place] tree pattern, but no hallmark patches noted and there are no involvement on the chest or abdomen.  No signs of pustules, cellulitis, or excoriation.  There is no central clearing in these lesions.   Psychiatric: She has a normal mood and affect. Her behavior is normal. Judgment and thought content normal.           Clinical Decision Making:  Appearance of this rash resembles possible pityriasis rosea, however the patient's age makes this unlikely in the distribution does not include the chest or abdomen which is unlike pityriasis.  Also consider the possibility of fungal etiology, but the lesions are very small and large numbers without any signs of central clearing.  Upon learning of the patient's medical history of lichen planus I found that this rash does resemble possible cutaneous lichen planus.  Patient was started on high potency steroid cream as treatment.  She will continue to follow-up with dermatology possible biopsy.  I also consider the possibility for squamous cell carcinoma or actinic keratosis.  Patient to follow-up with Derm for diagnosis.  At the end of the encounter, I discussed results, diagnosis, medications. Discussed red flags for immediate return to clinic/ER, as well as indications for follow up if no improvement. Patient understood and agreed to plan. Patient was stable for discharge.    1. Rash and nonspecific skin eruption  betamethasone, augmented, (DIPROLENE-AF) 0.05 % cream         Patient Instructions   1.  Follow-up with your  dermatologist for possible biopsy of these skin lesions.  They may be lichen planus also, so I will be treating this topically with a high potency steroid.  2.  Follow-up if you develop any pus,  growing redness, or worsening of your symptoms.

## 2021-07-30 ENCOUNTER — OFFICE VISIT (OUTPATIENT)
Dept: FAMILY MEDICINE | Facility: CLINIC | Age: 71
End: 2021-07-30
Payer: COMMERCIAL

## 2021-07-30 VITALS
HEART RATE: 60 BPM | WEIGHT: 149 LBS | BODY MASS INDEX: 23.16 KG/M2 | SYSTOLIC BLOOD PRESSURE: 108 MMHG | DIASTOLIC BLOOD PRESSURE: 64 MMHG

## 2021-07-30 DIAGNOSIS — L98.9 SKIN LESION: Primary | ICD-10-CM

## 2021-07-30 DIAGNOSIS — E78.2 MIXED HYPERLIPIDEMIA: ICD-10-CM

## 2021-07-30 PROCEDURE — 99214 OFFICE O/P EST MOD 30 MIN: CPT | Performed by: FAMILY MEDICINE

## 2021-07-30 NOTE — PROGRESS NOTES
Assessment & Plan     Skin lesion  Suspect basal cell carcinoma.  Given the size lesion, greater than 1 cm, in the ancillary area of roughened skin appreciated approximately 2 cm from the active lesion, I recommended the patient see dermatology.  She already has an appointment scheduled in 2.5 weeks spent approximately 10 minutes of patient's appointment attempting to schedule her an earlier appointment but had to leave a message.    Mixed hyperlipidemia  Rest of the visit was spent convincing patient that a statin would likely be beneficial given her risk of stroke and heart attack in the next 10 years.  Patient will consider taking a statin in the future.      No follow-ups on file.    Roz Garcia MD  [facility] JIGNESH Joy is a 70 year old who presents for the following health issues  HPI : skin lesion    Pt first noticed it on the upper left extremity about a month ago.  Since that time it has nearly doubled in size.  Patient notices no significant irritation although she presses on it it can be tender.  Patient has had lesions cut out before and usually follows with dermatology once yearly, but she was remiss last year secondary to Covid.    Patient also declines ever taking her statin medication.        Objective    /64   Pulse 60   Wt 67.6 kg (149 lb)   BMI 23.16 kg/m    Body mass index is 23.16 kg/m .  Physical Exam   Gen: NAD  Psych: Normal affect, no hallucinations or delusions, not tearful  Skin: On the dorsal aspect of patient's left upper extremity is a 1.5 cm roughly circular heaped up pearly excoriated papular erythematous lesion with a central area of necrosis versus brown pigmentation and an additional ancillary lesion located roughly 2 cm away that is less discrete and is

## 2021-09-26 ENCOUNTER — HEALTH MAINTENANCE LETTER (OUTPATIENT)
Age: 71
End: 2021-09-26

## 2021-11-16 ENCOUNTER — OFFICE VISIT (OUTPATIENT)
Dept: FAMILY MEDICINE | Facility: CLINIC | Age: 71
End: 2021-11-16
Payer: COMMERCIAL

## 2021-11-16 VITALS
OXYGEN SATURATION: 99 % | WEIGHT: 150 LBS | HEIGHT: 67 IN | BODY MASS INDEX: 23.54 KG/M2 | DIASTOLIC BLOOD PRESSURE: 64 MMHG | HEART RATE: 55 BPM | SYSTOLIC BLOOD PRESSURE: 101 MMHG

## 2021-11-16 DIAGNOSIS — Z00.00 HEALTHCARE MAINTENANCE: ICD-10-CM

## 2021-11-16 DIAGNOSIS — N95.2 ATROPHIC VAGINITIS: ICD-10-CM

## 2021-11-16 DIAGNOSIS — Z78.0 ASYMPTOMATIC MENOPAUSE: Primary | ICD-10-CM

## 2021-11-16 DIAGNOSIS — Z12.31 ENCOUNTER FOR SCREENING MAMMOGRAM FOR BREAST CANCER: ICD-10-CM

## 2021-11-16 LAB
ALBUMIN SERPL-MCNC: 3.9 G/DL (ref 3.5–5)
ALP SERPL-CCNC: 68 U/L (ref 45–120)
ALT SERPL W P-5'-P-CCNC: 17 U/L (ref 0–45)
ANION GAP SERPL CALCULATED.3IONS-SCNC: 9 MMOL/L (ref 5–18)
AST SERPL W P-5'-P-CCNC: 21 U/L (ref 0–40)
BILIRUB SERPL-MCNC: 0.5 MG/DL (ref 0–1)
BUN SERPL-MCNC: 21 MG/DL (ref 8–28)
CALCIUM SERPL-MCNC: 9.8 MG/DL (ref 8.5–10.5)
CHLORIDE BLD-SCNC: 106 MMOL/L (ref 98–107)
CHOLEST SERPL-MCNC: 230 MG/DL
CO2 SERPL-SCNC: 26 MMOL/L (ref 22–31)
CREAT SERPL-MCNC: 0.9 MG/DL (ref 0.6–1.1)
FASTING STATUS PATIENT QL REPORTED: YES
GFR SERPL CREATININE-BSD FRML MDRD: 64 ML/MIN/1.73M2
GLUCOSE BLD-MCNC: 91 MG/DL (ref 70–125)
HDLC SERPL-MCNC: 87 MG/DL
LDLC SERPL CALC-MCNC: 132 MG/DL
POTASSIUM BLD-SCNC: 4.7 MMOL/L (ref 3.5–5)
PROT SERPL-MCNC: 6.7 G/DL (ref 6–8)
SODIUM SERPL-SCNC: 141 MMOL/L (ref 136–145)
TRIGL SERPL-MCNC: 57 MG/DL

## 2021-11-16 PROCEDURE — 80053 COMPREHEN METABOLIC PANEL: CPT | Performed by: FAMILY MEDICINE

## 2021-11-16 PROCEDURE — 80061 LIPID PANEL: CPT | Performed by: FAMILY MEDICINE

## 2021-11-16 PROCEDURE — 99397 PER PM REEVAL EST PAT 65+ YR: CPT | Performed by: FAMILY MEDICINE

## 2021-11-16 PROCEDURE — 99213 OFFICE O/P EST LOW 20 MIN: CPT | Mod: 25 | Performed by: FAMILY MEDICINE

## 2021-11-16 PROCEDURE — 36415 COLL VENOUS BLD VENIPUNCTURE: CPT | Performed by: FAMILY MEDICINE

## 2021-11-16 ASSESSMENT — ACTIVITIES OF DAILY LIVING (ADL): CURRENT_FUNCTION: NO ASSISTANCE NEEDED

## 2021-11-16 ASSESSMENT — MIFFLIN-ST. JEOR: SCORE: 1218.03

## 2021-11-16 NOTE — PATIENT INSTRUCTIONS
Premarin:  Start with once nightly for 1-2 weeks, then every other night for 1-2 weeks, then twice weekly forever.     Consider red rice yeast for statin replacement.    Double check shingles vaccine coverage.     Consider magnesium replacement at night for cramps.

## 2021-11-16 NOTE — PROGRESS NOTES
"SUBJECTIVE:   Benita Hogue is a 71 year old female who presents for Preventive Visit.    Patient has been experiencing prolapse for several years.  She has almost no symptoms from it.  She is reluctant to have a hysterectomy which would be the ideal surgical solution.  Patient does have some discomfort or lack of interest with sex.    Patient has been advised of split billing requirements and indicates understanding: Yes   Are you in the first 12 months of your Medicare coverage?  No    Healthy Habits:     In general, how would you rate your overall health?  Excellent    Frequency of exercise:  4-5 days/week    Duration of exercise:  30-45 minutes    Do you usually eat at least 4 servings of fruit and vegetables a day, include whole grains    & fiber and avoid regularly eating high fat or \"junk\" foods?  Yes    Taking medications regularly:  Yes    Medication side effects:  None    Ability to successfully perform activities of daily living:  No assistance needed    Home Safety:  No safety concerns identified    Hearing Impairment:  No hearing concerns    In the past 6 months, have you been bothered by leaking of urine?  No    In general, how would you rate your overall mental or emotional health?  Good      PHQ-2 Total Score: 0    Additional concerns today:  No    Do you feel safe in your environment? Yes    Have you ever done Advance Care Planning? (For example, a Health Directive, POLST, or a discussion with a medical provider or your loved ones about your wishes): Yes, patient states has an Advance Care Planning document and will bring a copy to the clinic.      Fall risk  Fallen 2 or more times in the past year?: (P) No  Any fall with injury in the past year?: (P) No  click delete button to remove this line now  Cognitive Screening   1) Repeat 3 items (Leader, Season, Table)    2) Clock draw: NORMAL  3) 3 item recall: Recalls 3 objects  Results: 3 items recalled: COGNITIVE IMPAIRMENT LESS " "LIKELY    Mini-CogTM Copyright S Debbie. Licensed by the author for use in [facility]; reprinted with permission (ros@.[facility]). All rights reserved.        Reviewed and updated as needed this visit by clinical staff  Tobacco  Allergies  Meds  Problems  Med Hx  Surg Hx  Fam Hx  Soc Hx         Reviewed and updated as needed this visit by Provider  Tobacco  Allergies  Meds  Problems  Med Hx  Surg Hx  Fam Hx  Soc Hx        Social History     Tobacco Use     Smoking status: Never Smoker     Smokeless tobacco: Never Used   Substance Use Topics     Alcohol use: No       Alcohol Use 11/16/2021   Prescreen: >3 drinks/day or >7 drinks/week? No       Current providers sharing in care for this patient include:   Patient Care Team:  Roz Garcia MD as PCP - General (Family Practice)  Roz Garcia MD as Assigned PCP    The following health maintenance items are reviewed in Epic and correct as of today:  Health Maintenance Due   Topic Date Due     DEXA  Never done     ZOSTER IMMUNIZATION (3 of 3) 11/22/2019     INFLUENZA VACCINE (1) 09/01/2021     COVID-19 Vaccine (3 - Booster for Moderna series) 10/13/2021       Review of Systems  Constitutional, HEENT, cardiovascular, pulmonary, GI, , musculoskeletal, neuro, skin, endocrine and psych systems are negative, except as otherwise noted.    OBJECTIVE:   /64   Pulse 55   Ht 1.702 m (5' 7\")   Wt 68 kg (150 lb)   SpO2 99%   BMI 23.49 kg/m   Estimated body mass index is 23.49 kg/m  as calculated from the following:    Height as of this encounter: 1.702 m (5' 7\").    Weight as of this encounter: 68 kg (150 lb).  Physical Exam  Gen: Alert, NAD, appears stated age, normal hygiene   Eyes: conjunctivae without injection, sclera clear, EOMI  ENT/mouth: nares clear, septum midline, absent rhinorrhea, throat without injection, neck is supple, no thyroid enlargement  CV: RRR, no murmur appreciated, pedal edema absent bilaterally  Resp: " CTAB, no wheezes, rales or ronchi  ABD: normoactive, non-tender to palpation, nondistended  MSK: grossly full range of motion in all joints, no obvious deformity  Neuro: CN II-XII grossly intact, no deficits in coordination  Psych: no apparent hallucinations or delusions, no pressured speech; alert, oriented x3  SKIN: dry and without lesions  Heme/lymph: no pallor, no active bleeding/bruising, no adenopathy appreciated  Breast: nontender to palpation, no masses appreciated, no nipple discharge      ASSESSMENT / PLAN:   Pt presents for AWV:    USPSTF Recommendations for age 71:  Patient has been counseled on/screened for:  - intimate partner violence and there are no concerns at this time  - a healthful diet and physical activity for CVD prevention  - Diabetes and hyperlipidemia: screening was performed.   - Hep C, negative in 2018  - Asprin use: patient chose not to start  Sexually transmitted infections: Patient would not like to be screened for chlamydia, gonorrhea, syphilis, HIV, and hepatitis  Colorectal Cancer: Colonoscopy last performed in 2017, due in 2022  Immunizations: up to date except for influenza, covid booster, and shingles which was recommended  Mammogram: ordered today  Bone Density: Dexa scan ordered today  Fall risk assessment: Get up and go test completed today without concern      1. Asymptomatic menopause  - DEXA HIP/PELVIS/SPINE - Future; Future    2. Atrophic vaginitis  Trial of estrogen prior to considering surgical solutions.  - conjugated estrogens (PREMARIN) 0.625 MG/GM vaginal cream; Place 0.5 g vaginally three times a week  Dispense: 30 g; Refill: 11    3. Healthcare maintenance  - Comprehensive metabolic panel (BMP + Alb, Alk Phos, ALT, AST, Total. Bili, TP); Future  - Lipid panel reflex to direct LDL Fasting; Future    4. Encounter for screening mammogram for breast cancer  - *MA Screening Digital Bilateral; Future        Patient has been advised of split billing requirements and  "indicates understanding: Yes  COUNSELING:  Reviewed preventive health counseling, as reflected in patient instructions    Estimated body mass index is 23.49 kg/m  as calculated from the following:    Height as of this encounter: 1.702 m (5' 7\").    Weight as of this encounter: 68 kg (150 lb).      She reports that she has never smoked. She has never used smokeless tobacco.      Appropriate preventive services were discussed with this patient, including applicable screening as appropriate for cardiovascular disease, diabetes, osteopenia/osteoporosis, and glaucoma.  As appropriate for age/gender, discussed screening for colorectal cancer, prostate cancer, breast cancer, and cervical cancer. Checklist reviewing preventive services available has been given to the patient.    Reviewed patients plan of care and provided an AVS. The Basic Care Plan (routine screening as documented in Health Maintenance) for Benita meets the Care Plan requirement. This Care Plan has been established and reviewed with the Patient.    Counseling Resources:  ATP IV Guidelines  Pooled Cohorts Equation Calculator  Breast Cancer Risk Calculator  Breast Cancer: Medication to Reduce Risk  FRAX Risk Assessment  ICSI Preventive Guidelines  Dietary Guidelines for Americans, 2010  [employer]'s MyPlate  ASA Prophylaxis  Lung CA Screening    Roz Garcia MD  [facility]    Identified Health Risks:  "

## 2021-11-18 DIAGNOSIS — F51.01 PRIMARY INSOMNIA: Primary | ICD-10-CM

## 2021-11-18 RX ORDER — TRAZODONE HYDROCHLORIDE 50 MG/1
50 TABLET, FILM COATED ORAL AT BEDTIME
Qty: 30 TABLET | Refills: 3 | Status: SHIPPED | OUTPATIENT
Start: 2021-11-18 | End: 2022-09-08

## 2021-11-22 ENCOUNTER — TELEPHONE (OUTPATIENT)
Dept: FAMILY MEDICINE | Facility: CLINIC | Age: 71
End: 2021-11-22
Payer: COMMERCIAL

## 2021-11-22 NOTE — TELEPHONE ENCOUNTER
I WOULD LIKE YOU TO FILL OUT MY MEDICARE ANNUAL WELLNESS VISIT FORM FOR ME TO RECEIVE MY GIFT CARD THANK YOU. PLEASE MAIL WHEN DONE I HAVE ENCLOSED AN ENVELOPE WITH [employer].

## 2022-05-30 ENCOUNTER — MYC MEDICAL ADVICE (OUTPATIENT)
Dept: FAMILY MEDICINE | Facility: CLINIC | Age: 72
End: 2022-05-30
Payer: COMMERCIAL

## 2022-05-31 NOTE — TELEPHONE ENCOUNTER
Routing to covering provider for Dr. Garcia to advise on patient's request.    Patient's last visit was in November 2021 with Dr. Garcia.    Thank you,    Virgen Hays RN on 5/31/2022 at 10:52 AM

## 2022-09-08 ENCOUNTER — OFFICE VISIT (OUTPATIENT)
Dept: FAMILY MEDICINE | Facility: CLINIC | Age: 72
End: 2022-09-08
Payer: COMMERCIAL

## 2022-09-08 VITALS
BODY MASS INDEX: 23.54 KG/M2 | SYSTOLIC BLOOD PRESSURE: 118 MMHG | WEIGHT: 150 LBS | OXYGEN SATURATION: 98 % | DIASTOLIC BLOOD PRESSURE: 66 MMHG | HEART RATE: 72 BPM | HEIGHT: 67 IN | RESPIRATION RATE: 18 BRPM | TEMPERATURE: 98.6 F

## 2022-09-08 DIAGNOSIS — Z00.00 ENCOUNTER FOR ANNUAL WELLNESS EXAM IN MEDICARE PATIENT: Primary | ICD-10-CM

## 2022-09-08 DIAGNOSIS — Z78.0 POSTMENOPAUSAL STATUS: ICD-10-CM

## 2022-09-08 DIAGNOSIS — E78.2 MIXED HYPERLIPIDEMIA: ICD-10-CM

## 2022-09-08 DIAGNOSIS — Z79.899 MEDICATION MANAGEMENT: ICD-10-CM

## 2022-09-08 DIAGNOSIS — G47.00 INSOMNIA, UNSPECIFIED TYPE: ICD-10-CM

## 2022-09-08 LAB
ALBUMIN UR-MCNC: NEGATIVE MG/DL
APPEARANCE UR: CLEAR
BILIRUB UR QL STRIP: NEGATIVE
CHOLEST SERPL-MCNC: 215 MG/DL
COLOR UR AUTO: YELLOW
ERYTHROCYTE [DISTWIDTH] IN BLOOD BY AUTOMATED COUNT: 12.4 % (ref 10–15)
GLUCOSE UR STRIP-MCNC: NEGATIVE MG/DL
HCT VFR BLD AUTO: 42.9 % (ref 35–47)
HDLC SERPL-MCNC: 95 MG/DL
HGB BLD-MCNC: 14.4 G/DL (ref 11.7–15.7)
HGB UR QL STRIP: NEGATIVE
KETONES UR STRIP-MCNC: NEGATIVE MG/DL
LDLC SERPL CALC-MCNC: 110 MG/DL
LEUKOCYTE ESTERASE UR QL STRIP: NEGATIVE
MCH RBC QN AUTO: 28.7 PG (ref 26.5–33)
MCHC RBC AUTO-ENTMCNC: 33.6 G/DL (ref 31.5–36.5)
MCV RBC AUTO: 86 FL (ref 78–100)
NITRATE UR QL: NEGATIVE
NONHDLC SERPL-MCNC: 120 MG/DL
PH UR STRIP: 7 [PH] (ref 5–8)
PLATELET # BLD AUTO: 258 10E3/UL (ref 150–450)
RBC # BLD AUTO: 5.01 10E6/UL (ref 3.8–5.2)
SP GR UR STRIP: 1.02 (ref 1–1.03)
TRIGL SERPL-MCNC: 51 MG/DL
TSH SERPL DL<=0.005 MIU/L-ACNC: 3.07 UIU/ML (ref 0.3–4.2)
UROBILINOGEN UR STRIP-ACNC: 0.2 E.U./DL
WBC # BLD AUTO: 4.2 10E3/UL (ref 4–11)

## 2022-09-08 PROCEDURE — 84443 ASSAY THYROID STIM HORMONE: CPT | Performed by: NURSE PRACTITIONER

## 2022-09-08 PROCEDURE — 81003 URINALYSIS AUTO W/O SCOPE: CPT | Performed by: NURSE PRACTITIONER

## 2022-09-08 PROCEDURE — G0439 PPPS, SUBSEQ VISIT: HCPCS | Performed by: NURSE PRACTITIONER

## 2022-09-08 PROCEDURE — 85027 COMPLETE CBC AUTOMATED: CPT | Performed by: NURSE PRACTITIONER

## 2022-09-08 PROCEDURE — 36415 COLL VENOUS BLD VENIPUNCTURE: CPT | Performed by: NURSE PRACTITIONER

## 2022-09-08 PROCEDURE — 80061 LIPID PANEL: CPT | Performed by: NURSE PRACTITIONER

## 2022-09-08 ASSESSMENT — ACTIVITIES OF DAILY LIVING (ADL)
DOING_ERRANDS_INDEPENDENTLY_DIFFICULTY: NO
TOILETING_ISSUES: NO
CONCENTRATING,_REMEMBERING_OR_MAKING_DECISIONS_DIFFICULTY: NO
WALKING_OR_CLIMBING_STAIRS_DIFFICULTY: NO
DRESSING/BATHING_DIFFICULTY: NO
CHANGE_IN_FUNCTIONAL_STATUS_SINCE_ONSET_OF_CURRENT_ILLNESS/INJURY: NO
FALL_HISTORY_WITHIN_LAST_SIX_MONTHS: NO
DIFFICULTY_EATING/SWALLOWING: NO
HEARING_DIFFICULTY_OR_DEAF: NO
DIFFICULTY_COMMUNICATING: NO
CURRENT_FUNCTION: NO ASSISTANCE NEEDED
WEAR_GLASSES_OR_BLIND: YES

## 2022-09-08 ASSESSMENT — ENCOUNTER SYMPTOMS
COUGH: 0
HEMATOCHEZIA: 0
FREQUENCY: 0
FEVER: 0
WEAKNESS: 0
EYE PAIN: 0
BREAST MASS: 0
PALPITATIONS: 0
SHORTNESS OF BREATH: 0
ARTHRALGIAS: 0
PARESTHESIAS: 0
NAUSEA: 0
SORE THROAT: 0
DYSURIA: 0
MYALGIAS: 0
DIARRHEA: 0
ABDOMINAL PAIN: 0
CONSTIPATION: 0
HEADACHES: 0
HEARTBURN: 0
JOINT SWELLING: 0
NERVOUS/ANXIOUS: 0
CHILLS: 0
DIZZINESS: 0
HEMATURIA: 0

## 2022-09-08 ASSESSMENT — PAIN SCALES - GENERAL: PAINLEVEL: NO PAIN (0)

## 2022-09-08 NOTE — PROGRESS NOTES
SUBJECTIVE:   Benita Hogue is a 72 year old female who presents for Preventive Visit.    Patient has been  for 51 years.  She has 5 children.  She tries to consume healthy diet.  She walks or bikes daily.  She has a history of hyperlipidemia and is taking red yeast rice.  Patient experiences insomnia with travel.  She took hydroxyzine in the past and would like to resume this.  She was prescribed trazodone, but did not try this medication.  Patient has a history of mood fluctuations and sadness.  She feels as though that she is able to control this without medication.  Her sister takes escitalopram.  She denies thoughts of suicide.  Patient has tried to maintain friendships via phone calls.  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  She is considering returning to work part-time.      Patient has been advised of split billing requirements and indicates understanding: Yes  Are you in the first 12 months of your Medicare coverage?  No    Do you feel safe in your environment? Yes    Have you ever done Advance Care Planning? (For example, a Health Directive, POLST, or a discussion with a medical provider or your loved ones about your wishes): Yes, advance care planning is on file.    Cognitive Screening   1) Repeat 3 items (Leader, Season, Table)    2) Clock draw: NORMAL  3) 3 item recall: Recalls 3 objects  Results: 3 items recalled: COGNITIVE IMPAIRMENT LESS LIKELY    Mini-CogTM Copyright HALLIE Lewis. Licensed by the author for use in [facility]; reprinted with permission (ros@.[email or web address]). All rights reserved.      Do you have sleep apnea, excessive snoring or daytime drowsiness?: no    Reviewed and updated as needed this visit by clinical staff    Reviewed and updated as needed this visit by Provider    Social History     Tobacco Use     Smoking status: Never Smoker     Smokeless tobacco: Never Used   Substance Use Topics     Alcohol use: No     If you drink alcohol do  "you typically have >3 drinks per day or >7 drinks per week? No    Alcohol Use 9/8/2022   Prescreen: >3 drinks/day or >7 drinks/week? No   Prescreen: >3 drinks/day or >7 drinks/week? -   No flowsheet data found.        Current providers sharing in care for this patient include:   Patient Care Team:  Risa Bhakta APRN CNP as PCP - General (Family Medicine)  Roz Garcia MD as Assigned PCP    The following health maintenance items are reviewed in Epic and correct as of today:  Health Maintenance Due   Topic Date Due     DEXA  Never done     COVID-19 Vaccine (4 - Booster for Moderna series) 03/26/2022     INFLUENZA VACCINE (1) 09/01/2022           Review of Systems   Constitutional: Negative for chills and fever.   HENT: Negative for congestion, ear pain, hearing loss and sore throat.    Eyes: Negative for pain and visual disturbance.   Respiratory: Negative for cough and shortness of breath.    Cardiovascular: Negative for chest pain, palpitations and peripheral edema.   Gastrointestinal: Negative for abdominal pain, constipation, diarrhea, heartburn, hematochezia and nausea.   Breasts:  Negative for tenderness, breast mass and discharge.   Genitourinary: Positive for urgency. Negative for dysuria, frequency, genital sores, hematuria, pelvic pain, vaginal bleeding and vaginal discharge.   Musculoskeletal: Negative for arthralgias, joint swelling and myalgias.   Skin: Negative for rash.   Neurological: Negative for dizziness, weakness, headaches and paresthesias.   Psychiatric/Behavioral: Negative for mood changes. The patient is not nervous/anxious.      Constitutional, HEENT, cardiovascular, pulmonary, gi and gu systems are negative, except as otherwise noted.    OBJECTIVE:   /66   Pulse 72   Temp 98.6  F (37  C)   Resp 18   Ht 1.702 m (5' 7\")   Wt 68 kg (150 lb)   SpO2 98%   BMI 23.49 kg/m   Estimated body mass index is 23.49 kg/m  as calculated from the following:    Height as of this " "encounter: 1.702 m (5' 7\").    Weight as of this encounter: 68 kg (150 lb).  Physical Exam  GENERAL: healthy, alert and no distress  EYES: Eyes grossly normal to inspection, PERRL and conjunctivae and sclerae normal  HENT: ear canals and TM's normal, nose and mouth without ulcers or lesions  NECK: no adenopathy, no asymmetry, masses, or scars and thyroid normal to palpation  RESP: lungs clear to auscultation - no rales, rhonchi or wheezes  BREAST: normal without masses, tenderness or nipple discharge and no palpable axillary masses or adenopathy  CV: regular rate and rhythm, normal S1 S2, no S3 or S4, no murmur, click or rub, no peripheral edema and peripheral pulses strong  ABDOMEN: soft, nontender, no hepatosplenomegaly, no masses and bowel sounds normal  MS: no gross musculoskeletal defects noted, no edema  SKIN: no suspicious lesions or rashes  NEURO: Normal strength and tone, mentation intact and speech normal  PSYCH: mentation appears normal, affect normal/bright      ASSESSMENT / PLAN:   Encounter for annual wellness exam in Medicare patient  Recommend consuming a healthy diet and exercising.  She declines influenza vaccine.  She has a mammogram scheduled.  - CBC with platelets  - TSH with free T4 reflex  - UA Macro with Reflex to Micro and Culture - lab collect  - CBC with platelets  - TSH with free T4 reflex  - UA Macro with Reflex to Micro and Culture - lab collect    Mixed hyperlipidemia  She is not currently taking prescription medication.  She is taking over-the-counter red yeast rice.  Will notify patient of results.  - Lipid panel reflex to direct LDL Fasting  - Lipid panel reflex to direct LDL Fasting    Insomnia, unspecified type  Discussed that hydroxyzine is not safe in her age group.  I encouraged her to try the trazodone.  Patient may consider over-the-counter melatonin as another option.  Discussed good sleep hygiene.    Postmenopausal status  Discussed adequate calcium and vitamin D intake.  - " "DX Hip/Pelvis/Spine    Medication management  - Comprehensive metabolic panel (BMP + Alb, Alk Phos, ALT, AST, Total. Bili, TP)        Patient has been advised of split billing requirements and indicates understanding: Yes    COUNSELING:  Answers for HPI/ROS submitted by the patient on 9/8/2022  In general, how would you rate your overall physical health?: excellent  Frequency of exercise:: 4-5 days/week  Do you usually eat at least 4 servings of fruit and vegetables a day, include whole grains & fiber, and avoid regularly eating high fat or \"junk\" foods? : Yes  Taking medications regularly:: Yes  Medication side effects:: None  Activities of Daily Living: no assistance needed  Home safety: no safety concerns identified  Hearing Impairment:: no hearing concerns  In the past 6 months, have you been bothered by leaking of urine?: No  In general, how would you rate your overall mental or emotional health?: good  Additional concerns today:: No  Duration of exercise:: 30-45 minutes        Estimated body mass index is 23.49 kg/m  as calculated from the following:    Height as of this encounter: 1.702 m (5' 7\").    Weight as of this encounter: 68 kg (150 lb).        She reports that she has never smoked. She has never used smokeless tobacco.      Appropriate preventive services were discussed with this patient, including applicable screening as appropriate for cardiovascular disease, diabetes, osteopenia/osteoporosis, and glaucoma.  As appropriate for age/gender, discussed screening for colorectal cancer, prostate cancer, breast cancer, and cervical cancer. Checklist reviewing preventive services available has been given to the patient.    Reviewed patients plan of care and provided an AVS. The Basic Care Plan (routine screening as documented in Health Maintenance) for Benita meets the Care Plan requirement. This Care Plan has been established and reviewed with the Patient.    Counseling Resources:  ATP IV " Guidelines  Pooled Cohorts Equation Calculator  Breast Cancer Risk Calculator  Breast Cancer: Medication to Reduce Risk  FRAX Risk Assessment  ICSI Preventive Guidelines  Dietary Guidelines for Americans, 2010  USDA's MyPlate  ASA Prophylaxis  Lung CA Screening    SHAN Martinez CNP  [facility]    Identified Health Risks:

## 2022-09-20 DIAGNOSIS — E78.2 MIXED HYPERLIPIDEMIA: Primary | ICD-10-CM

## 2022-09-29 ENCOUNTER — HOSPITAL ENCOUNTER (OUTPATIENT)
Dept: MAMMOGRAPHY | Facility: CLINIC | Age: 72
Discharge: HOME | End: 2022-09-29
Attending: NURSE PRACTITIONER | Admitting: NURSE PRACTITIONER
Payer: COMMERCIAL

## 2022-09-29 ENCOUNTER — ANCILLARY PROCEDURE (OUTPATIENT)
Dept: BONE DENSITY | Facility: CLINIC | Age: 72
End: 2022-09-29
Attending: NURSE PRACTITIONER
Payer: COMMERCIAL

## 2022-09-29 DIAGNOSIS — Z12.31 VISIT FOR SCREENING MAMMOGRAM: ICD-10-CM

## 2022-09-29 DIAGNOSIS — Z78.0 POSTMENOPAUSAL STATUS: ICD-10-CM

## 2022-09-29 PROCEDURE — 77067 SCR MAMMO BI INCL CAD: CPT

## 2022-09-29 PROCEDURE — 77080 DXA BONE DENSITY AXIAL: CPT | Mod: TC | Performed by: RADIOLOGY

## 2023-02-15 NOTE — TELEPHONE ENCOUNTER
Pending Prescriptions:                       Disp   Refills    ESTRIOL 1MG/GRAM CREAM                                        Sig: Apply 1/2 gm vaginally every night as needed    Signed Prescriptions:                        Disp   Refills    ESTRIOL 1MG/GRAM CREAM                                     Sig: Apply 0.5 g topically 2 times daily  Authorizing Provider: PATIENT REPORTED  Ordering User: CONCHA DOYLE    Script entered as written on pharmacy request.

## 2023-04-23 ENCOUNTER — HEALTH MAINTENANCE LETTER (OUTPATIENT)
Age: 73
End: 2023-04-23

## 2023-07-06 ENCOUNTER — HOSPITAL ENCOUNTER (EMERGENCY)
Facility: HOSPITAL | Age: 73
Discharge: HOME | End: 2023-07-07
Payer: COMMERCIAL

## 2023-07-06 ENCOUNTER — APPOINTMENT (OUTPATIENT)
Dept: RADIOLOGY | Facility: HOSPITAL | Age: 73
End: 2023-07-06
Attending: STUDENT IN AN ORGANIZED HEALTH CARE EDUCATION/TRAINING PROGRAM
Payer: COMMERCIAL

## 2023-07-06 ENCOUNTER — APPOINTMENT (OUTPATIENT)
Dept: CT IMAGING | Facility: HOSPITAL | Age: 73
End: 2023-07-06
Payer: COMMERCIAL

## 2023-07-06 VITALS
SYSTOLIC BLOOD PRESSURE: 139 MMHG | TEMPERATURE: 98.9 F | BODY MASS INDEX: 22.76 KG/M2 | HEIGHT: 67 IN | DIASTOLIC BLOOD PRESSURE: 60 MMHG | RESPIRATION RATE: 14 BRPM | WEIGHT: 145 LBS | OXYGEN SATURATION: 98 % | HEART RATE: 96 BPM

## 2023-07-06 DIAGNOSIS — S30.0XXA TRAUMATIC HEMATOMA OF BUTTOCK, INITIAL ENCOUNTER: ICD-10-CM
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
## 2023-07-06 PROCEDURE — 99284 EMERGENCY DEPT VISIT MOD MDM: CPT | Mod: 25

## 2023-07-06 PROCEDURE — 70450 CT HEAD/BRAIN W/O DYE: CPT

## 2023-07-06 PROCEDURE — 73502 X-RAY EXAM HIP UNI 2-3 VIEWS: CPT

## 2023-07-06 ASSESSMENT — ENCOUNTER SYMPTOMS
BACK PAIN: 0
COLOR CHANGE: 1
WEAKNESS: 0
NUMBNESS: 0
NECK PAIN: 0

## 2023-07-06 ASSESSMENT — ACTIVITIES OF DAILY LIVING (ADL): ADLS_ACUITY_SCORE: 35

## 2023-07-07 NOTE — ED PROVIDER NOTES
EMERGENCY DEPARTMENT ENCOUNTER      NAME: Benita Hogue  AGE: 72 year old female  YOB: 1950  MRN: [id number]  EVALUATION DATE & TIME: No admission date for patient encounter.    PCP: Risa Bhakta    ED PROVIDER: Shae Nieves PA-C      Chief Complaint   Patient presents with     Fall         FINAL IMPRESSION:  1. Fall, initial encounter    2. Traumatic hematoma of buttock, initial encounter          ED COURSE & MEDICAL DECISION MAKIN:22 PM I met with the patient to gather history and perform my exam. ED course and treatment discussed. Patient seen in [facility] due to critical capacity and boarding crisis leaving no ED rooms available.   8:24 PM I updated the patient with x-ray results.   11:19 PM Nurse notified that the patient left  11:54 PM PM Reevaluated and updated patient. I updated the patient with CT scan results. I discussed the plan for discharge with the patient, and patient is agreeable. We discussed supportive cares at home and reasons for return to the ER including new or worsening symptoms. All questions and concerns addressed. Patient to be discharged by RN.    Pertinent Labs & Imaging studies reviewed. (See chart for details)  72 year old female presents to the Emergency Department for evaluation of left buttock pain s/p mechanical fall at home. No head injury or loss of consciousness. Upon exam, patient is afebrile, hemodynamically stable, and in no acute distress. Left buttock hematoma and ecchymosis with tenderness to palpation. Neurovascularly intact distally. Differential diagnosis includes but not limited to fracture, dislocation, contusion, hematoma, osteoarthritic flare, muscle strain, intracranial hemorrhage. Using shared decision making, CT head ordered. XR revealed no acute fracture. CT revealed no acute intracranial hemorrhage.     Patient declined pain medication upon arrival as had Advil prior to arrival with improvement in pain. Symptoms and workup most  "consistent with hematoma s/p mechanical fall. Patient was made aware of the above findings. Plan to discharge patient home with symptomatic management, strict return precautions, and close follow up with their PCP for reevaluation and ongoing management. The patient was stable and well appearing upon discharge. The patient was advised to return to the ER if any new or worsening symptoms develop. The patient verbalizes understanding and agrees with the plan.     Medical Decision Making    History:    Supplemental history from: Documented in chart, if applicable    External Record(s) reviewed: Documented in chart, if applicable.    Work Up:    Chart documentation includes differential considered and any EKGs or imaging independently interpreted by provider, where specified.    In additional to work up documented, I considered the following work up: Documented in chart, if applicable.    External consultation:    Discussion of management with another provider: Documented in chart, if applicable    Complicating factors:    Care impacted by chronic illness: N/A    Care affected by social determinants of health: N/A    Disposition considerations: Discharge. No recommendations on prescription strength medication(s). See documentation for any additional details.        MEDICATIONS GIVEN IN THE EMERGENCY:  Medications - No data to display    NEW PRESCRIPTIONS STARTED AT TODAY'S ER VISIT  Discharge Medication List as of 7/7/2023 12:01 AM             =================================================================    HPI    Patient information was obtained from: Patient     Use of : N/A      Benita Hogue is a 72 year old female with a pertinent history of vertigo who presents to this ED via walk-in for evaluation of a fall    The patient reports she was walking down the wooden steps of her house around 1:30 PM when her socks \"slipped\" and she fell down one step onto her left buttock and left arm. The " "patient notes she used ice and tylenol which resolved her arm pain, but she still has a \"pretty nice hematoma\" to her left buttock that still feels sore. The patient denies any trouble walking, numbness to the groin, loss of bowel or bladder, numbness or weakness in legs, neck pain, or back pain.     REVIEW OF SYSTEMS   Review of Systems   Musculoskeletal: Negative for back pain and neck pain.   Skin: Positive for color change (hematoma to left buttock).   Neurological: Negative for weakness and numbness.   All other systems reviewed and are negative.    PAST MEDICAL HISTORY:  No past medical history on file.    PAST SURGICAL HISTORY:  No past surgical history on file.        CURRENT MEDICATIONS:    acetaminophen (TYLENOL ORAL)  CALCIUM-MAGNESIUM-ZINC ORAL  ESTRIOL 1MG/GRAM CREAM  ibuprofen (ADVIL) 200 MG tablet  POTASSIUM CHLORIDE PO        ALLERGIES:  No Known Allergies    FAMILY HISTORY:  Family History   Problem Relation Age of Onset     Heart Disease Mother      Cerebrovascular Disease Father      Breast Cancer Maternal Aunt        SOCIAL HISTORY:   Social History     Socioeconomic History     Marital status:    Tobacco Use     Smoking status: Never     Smokeless tobacco: Never   Substance and Sexual Activity     Alcohol use: No     Drug use: No     Sexual activity: Yes   Social History Narrative    Has grand kids. Likes to read. Works with elderly as LPN.       VITALS:  /60   Pulse 96   Temp 98.9  F (37.2  C) (Temporal)   Resp 14   Ht 1.702 m (5' 7\")   Wt 65.8 kg (145 lb)   SpO2 98%   BMI 22.71 kg/m      PHYSICAL EXAM    Constitutional:  Alert, in no acute distress. Cooperative.  EYES: Conjunctivae clear. PERRL. EOM intact. Peripheral fields intact.  HENT:  Atraumatic, normocephalic. Oropharynx clear. Moist membranes. Tongue without deviation.  Respiratory:  Respirations even, unlabored, in no acute respiratory distress. Lungs clear to auscultation bilaterally without wheeze, rhonchi, or " rales. No cough. Speaks in full sentences easily.  Cardiovascular:  Regular rate and rhythm, good peripheral perfusion. No peripheral edema. No chest wall tenderness.  GI: Soft, flat, non-distended.  Musculoskeletal: Lower extremities: tenderness to palpation over left buttock with ecchymosis and hematoma. No overlying erythema, warmth, purulence, fluctuance, crepitus. ROM left hip limited secondary to pain, otherwise full at knee, ankle, and right lower extremity. Neurovascularly intact distally. Cap refill <2 seconds. 2+ PT and DP pulses bilaterally. 5/5 strength. Compartments soft.  Integument: Warm, Dry. No rash to visualized skin.   Neurologic:  Alert & oriented x4. No focal deficits noted. GCS 15. Sensation intact. Motor intact. Coordination intact. 5/5 strength.   Psych: Normal mood and affect.      LAB:  All pertinent labs reviewed and interpreted.  Results for orders placed or performed during the hospital encounter of 07/06/23   XR Pelvis and Hip Left 2 Views    Impression    IMPRESSION: Normal joint spaces and alignment. No fracture. No degenerative changes.   Head CT w/o contrast    Impression    IMPRESSION:  1.  No acute intracranial process.       RADIOLOGY:  Reviewed all pertinent imaging. Please see official radiology report.  Head CT w/o contrast   Final Result   IMPRESSION:   1.  No acute intracranial process.      XR Pelvis and Hip Left 2 Views   Final Result   IMPRESSION: Normal joint spaces and alignment. No fracture. No degenerative changes.        I, Nicolasa Flaherty, am serving as a scribe to document services personally performed by Shae Nieves PA-C based on my observation and the provider's statements to me. I, Shae Nieves PA-C, attest that Nicolasa Flaherty is acting in a scribe capacity, has observed my performance of the services and has documented them in accordance with my direction.    Shae Nieves PA-C  [facility] EMERGENCY DEPARTMENT  [address]  [facility] [address]  [phone]      Shae Nieves PA-C  07/07/23 0019

## 2023-07-07 NOTE — ED NOTES
Pt not in assigned ER bed, this RN checked ER lobby, parking lot and rest of ER for pt, unable to locate pt.   Shae MARIE notified and will contact pt once CT results are back.

## 2023-07-07 NOTE — ED TRIAGE NOTES
Pt here d/t fall after slipping on 2 wood steps at home. Not on thinners. Did not hit head. Pt is A&O to all spheres. Pt states she has burning to her left buttock and has noted some swelling. Pt has steady gait. Denies dizziness, syncope, HA, SOB.      Triage Assessment       Row Name 07/06/23 1939       Triage Assessment (Adult)    Airway WDL WDL       Respiratory WDL    Respiratory WDL WDL       Skin Circulation/Temperature WDL    Skin Circulation/Temperature WDL WDL       Peripheral/Neurovascular WDL    Peripheral Neurovascular WDL WDL       Cognitive/Neuro/Behavioral WDL    Cognitive/Neuro/Behavioral WDL WDL

## 2023-07-07 NOTE — DISCHARGE INSTRUCTIONS
You were seen in the ER for left buttock pain after fall. Your XR showed no acute fracture. Your CT showed no intracranial hemorrhage.    Rest, ice/heat, compression, elevate your extremity, increase activity as tolerates. You may use topical Icy Hot or Lidoderm as needed. You may use ibuprofen for swelling/pain and Tylenol as needed for pain.    Tylenol (Acetaminophen) Discharge Instructions:  You may take 2 tablets of regular strength, over-the-counter, Tylenol (acetaminophen) every 4-6 hours as needed for pain.  Take no more than 4000 mg of Tylenol in a 24-hour period.      Avoid taking more than 1 acetaminophen-containing product at a time and be aware that many over-the-counter medications contain a combination of acetaminophen and other products.  If you are taking Tylenol in addition to a combination product please keep track of your daily acetaminophen dose to make sure you do not exceed the recommended 4000 mg.  Taking too much acetaminophen can cause permanent damage to your liver.    Ibuprofen/Naproxen Discharge Instructions:  You may take ibuprofen for pain control.  The maximum dose of (ibuprofen is 3200 mg ) in a 24-hour period.    Take this medication with food to prevent stomach irritation.  With long-term use this medication can irritate the stomach causing pain and lead to development of a stomach ulcer.  If you notice stomach pain or vomiting of coffee-ground colored vomit or blood, please be seen by a healthcare provider.  Attempt to use this medication for the shortest time possible.      Follow-up with your primary care provider for reevaluation and ongoing management.     Return to the emergency department for any new or worsening symptoms including increased pain, redness/warmth/drainage/swelling, fever/chills, new weakness, numbness/tingling, decreased range of motion, cold extremity, or any other concerning symptoms.     Take Care!  - Shae Nieves PA-C   normal...

## 2023-08-22 ENCOUNTER — PATIENT OUTREACH (OUTPATIENT)
Dept: CARE COORDINATION | Facility: CLINIC | Age: 73
End: 2023-08-22
Payer: COMMERCIAL

## 2023-11-16 ENCOUNTER — ANCILLARY ORDERS (OUTPATIENT)
Dept: MAMMOGRAPHY | Facility: CLINIC | Age: 73
End: 2023-11-16

## 2023-11-16 DIAGNOSIS — Z12.31 VISIT FOR SCREENING MAMMOGRAM: Primary | ICD-10-CM

## 2023-11-21 ENCOUNTER — ANCILLARY ORDERS (OUTPATIENT)
Dept: MAMMOGRAPHY | Facility: CLINIC | Age: 73
End: 2023-11-21

## 2023-11-21 DIAGNOSIS — Z12.31 VISIT FOR SCREENING MAMMOGRAM: Primary | ICD-10-CM

## 2023-12-03 ENCOUNTER — HEALTH MAINTENANCE LETTER (OUTPATIENT)
Age: 73
End: 2023-12-03

## 2023-12-07 ENCOUNTER — HOSPITAL ENCOUNTER (OUTPATIENT)
Dept: MAMMOGRAPHY | Facility: CLINIC | Age: 73
Discharge: HOME | End: 2023-12-07
Attending: NURSE PRACTITIONER | Admitting: NURSE PRACTITIONER
Payer: COMMERCIAL

## 2023-12-07 DIAGNOSIS — Z12.31 VISIT FOR SCREENING MAMMOGRAM: ICD-10-CM

## 2023-12-07 PROCEDURE — 77067 SCR MAMMO BI INCL CAD: CPT

## 2024-03-25 ENCOUNTER — OFFICE VISIT (OUTPATIENT)
Dept: FAMILY MEDICINE | Facility: CLINIC | Age: 74
End: 2024-03-25
Payer: COMMERCIAL

## 2024-03-25 VITALS
SYSTOLIC BLOOD PRESSURE: 112 MMHG | DIASTOLIC BLOOD PRESSURE: 60 MMHG | WEIGHT: 158 LBS | BODY MASS INDEX: 24.75 KG/M2 | OXYGEN SATURATION: 98 % | HEART RATE: 58 BPM

## 2024-03-25 DIAGNOSIS — M79.674 PAIN OF TOE OF RIGHT FOOT: Primary | ICD-10-CM

## 2024-03-25 PROCEDURE — 99213 OFFICE O/P EST LOW 20 MIN: CPT | Performed by: FAMILY MEDICINE

## 2024-03-25 NOTE — PROGRESS NOTES
Assessment & Plan     (M79.674) Pain of toe of right foot  (primary encounter diagnosis)  Comment: Right second toe pain, I believe that there is a mechanical deformity causing a cyst which is basically from constant irritation  Plan: Orthopedic  Referral         Referral to podiatry                  John Joy is a 73 year old, presenting for the following health issues:  Patient comes in with problems with the right second toe.  Patient has noticed that there is some change in deformity of the right second toe but in the last several weeks there is a bump on it that has developed is very painful and uncomfortable for her to get shoes on.  On examination of the right second toe it looks like there is the beginning of a hammertoe deformity, and because of that the toe  Digit is constantly being irritated and looks like there is a small cyst that is formed on top of that.  I told the patient that definitively she may need surgical correction of the toe, and the cyst itself may be removed as well, in the meantime the patient is wondering if I can drain the cyst    I did try sticking a 25-gauge needle and, I was not able to aspirate any fluid or pus.              No chief complaint on file.      3/25/2024     2:42 PM   Additional Questions   Roomed by Lizzie   Accompanied by self     [unfilled]                   Objective    There were no vitals taken for this visit.  There is no height or weight on file to calculate BMI.  [unfilled]               Signed Electronically by: Jh Riojas MD        John Joy is a 73 year old, presenting for the following health issues:  No chief complaint on file.      3/25/2024     2:42 PM   Additional Questions   Roomed by Lizzie   Accompanied by self     History of Present Illness       Reason for visit:  Pain in second toe on right foot    She eats 4 or more servings of fruits and vegetables daily.She consumes 0 sweetened beverage(s) daily.She  exercises with enough effort to increase her heart rate 30 to 60 minutes per day.  She exercises with enough effort to increase her heart rate 6 days per week.   She is taking medications regularly.                     Objective    There were no vitals taken for this visit.  There is no height or weight on file to calculate BMI.  Physical Exam   Right foot examination the right second toe has some hammertoe deformity and there is a small beadlike fluctuance which is slightly tender.  As mentioned above I did try to drain this unsuccessfully              Signed Electronically by: Jh Riojas MD

## 2024-04-09 ENCOUNTER — OFFICE VISIT (OUTPATIENT)
Dept: PODIATRY | Facility: CLINIC | Age: 74
End: 2024-04-09
Payer: COMMERCIAL

## 2024-04-09 VITALS — OXYGEN SATURATION: 96 % | HEART RATE: 74 BPM

## 2024-04-09 DIAGNOSIS — M20.42 HAMMERTOE, BILATERAL: Primary | ICD-10-CM

## 2024-04-09 DIAGNOSIS — M24.571 CONTRACTURE OF JOINTS OF BOTH ANKLE AND FOOT OF RIGHT LOWER EXTREMITY: ICD-10-CM

## 2024-04-09 DIAGNOSIS — M24.574 CONTRACTURE OF JOINTS OF BOTH ANKLE AND FOOT OF RIGHT LOWER EXTREMITY: ICD-10-CM

## 2024-04-09 DIAGNOSIS — M20.41 HAMMERTOE, BILATERAL: Primary | ICD-10-CM

## 2024-04-09 PROCEDURE — 99203 OFFICE O/P NEW LOW 30 MIN: CPT | Performed by: PODIATRIST

## 2024-04-09 ASSESSMENT — PAIN SCALES - GENERAL: PAINLEVEL: NO PAIN (0)

## 2024-04-09 NOTE — LETTER
4/9/2024         RE: Benita Hogue  [address]  [facility] [address]        Dear Colleague,    Thank you for referring your patient, Benita Hogue, to the [facility]. Please see a copy of my visit note below.          FOOT AND ANKLE SURGERY/PODIATRY CONSULT NOTE         ASSESSMENT:   Hammertoe bilateral feet  Joint contracture second MPJ right foot      TREATMENT:  -I discussed with the patient that she has a rigid contracture about the PIPJ and DIPJ of the second digit on the right foot along with a contracture at the second MPJ.  Remaining digits have semirigid contracture.    -Based on the above, we reviewed etiology of hammertoes and discussed treatment options including over-the-counter gel sleeves to reduce dorsal pressure.  I encouraged her to avoid any callus removing topical products as this can increase scar tissue formation and cause blistering.    -Also reviewed surgical treatment options including arthroplasty of the digits with release of the second MPJ.  We reviewed postoperative course and risks including but not limited to infection, recurrence and need for additional surgical intervention.    -Patient reports that she would like to avoid surgery over the summer months and will consider surgical treatment in the fall.  We will obtain weightbearing x-rays of the right foot when the patient returns for surgical consultation.    -Patient's questions invited and answered.  She was encouraged to call my office with any further questions or concerns.     30 minutes spent on the day of encounter doing chart review, history and exam, documentation, and further activities as noted.     Juvenal Berkowitz DPM  [facility] Podiatry/Foot & Ankle Surgery      HPI: I was asked to see Benita Hogue today by Dr. Riojas for pain in the second digit right foot.  Patient reports that she developed a cyst along the dorsal second digit on the right  foot which was drained recently with her primary provider.  She reports continued to have discomfort with ambulation and would like to discuss treatment options.  She has tried over-the-counter gel sleeves with minimal relief.  Past medical history is noncontributory.    No past medical history on file.      Social History     Socioeconomic History     Marital status:      Spouse name: Not on file     Number of children: Not on file     Years of education: Not on file     Highest education level: Not on file   Occupational History     Not on file   Tobacco Use     Smoking status: Never     Passive exposure: Never     Smokeless tobacco: Never   Vaping Use     Vaping Use: Never used   Substance and Sexual Activity     Alcohol use: No     Drug use: No     Sexual activity: Yes   Other Topics Concern     Not on file   Social History Narrative    Has grand kids. Likes to read. Works with elderly as LPN.     Social Determinants of Health     Financial Resource Strain: Not on file   Food Insecurity: Not on file   Transportation Needs: Not on file   Physical Activity: Not on file   Stress: Not on file   Social Connections: Not on file   Interpersonal Safety: Low Risk  (3/25/2024)    Interpersonal Safety      Do you feel physically and emotionally safe where you currently live?: Yes      Within the past 12 months, have you been hit, slapped, kicked or otherwise physically hurt by someone?: No      Within the past 12 months, have you been humiliated or emotionally abused in other ways by your partner or ex-partner?: No   Housing Stability: Not on file          No Known Allergies      MEDICATIONS:   Current Outpatient Medications   Medication Sig Dispense Refill     CALCIUM-MAGNESIUM-ZINC ORAL [CALCIUM-MAGNESIUM-ZINC ORAL] Take by mouth.       ESTRIOL 1MG/GRAM CREAM Apply 0.5 g topically 2 times daily 30 g 1     POTASSIUM CHLORIDE PO        acetaminophen (TYLENOL ORAL) [ACETAMINOPHEN (TYLENOL ORAL)] Take by mouth.  (Patient not taking: Reported on 3/25/2024)       ibuprofen (ADVIL) 200 MG tablet [IBUPROFEN (ADVIL) 200 MG TABLET] Take 200 mg by mouth every 6 (six) hours as needed for pain. (Patient not taking: Reported on 3/25/2024)       No current facility-administered medications for this visit.        Family History   Problem Relation Age of Onset     Heart Disease Mother      Cerebrovascular Disease Father      Breast Cancer Maternal Aunt           Review of Systems - 10 point Review of Systems is negative except for hammertoes which is noted in HPI.    OBJECTIVE:  Appearance: alert, well appearing, and in no distress.    VITAL SIGNS: Pulse 74   SpO2 96%       General appearance: Patient is alert and fully cooperative with history & exam.  No sign of distress is noted during the visit.     Psychiatric: Affect is pleasant & appropriate.  Patient appears motivated to improve health.     Respiratory: Breathing is regular & unlabored while sitting.     HEENT: Hearing is intact to spoken word.  Speech is clear.  No gross evidence of visual impairment that would impact ambulation.      Vascular: Dorsalis pedis and posterior tibial pulses are palpable. There is pedal hair growth bilateral.  CFT < 3 sec from anterior tibial surface to distal digits bilateral. There is no appreciable edema noted.  Dermatologic: Turgor and texture are within normal limits. No coloration or temperature changes. No primary or secondary lesions noted.  Neurologic: All epicritic and proprioceptive sensations are grossly intact bilateral.  Musculoskeletal: Rigid contracture PIPJ and DIPJ second digit right foot.  Semirigid contracture digits 3 through 5 right foot.  Contracture also identified and second MPJ right foot.          Again, thank you for allowing me to participate in the care of your patient.        Sincerely,        Juvenal Berkowitz DPM

## 2024-04-09 NOTE — PROGRESS NOTES
FOOT AND ANKLE SURGERY/PODIATRY CONSULT NOTE         ASSESSMENT:   Hammertoe bilateral feet  Joint contracture second MPJ right foot      TREATMENT:  -I discussed with the patient that she has a rigid contracture about the PIPJ and DIPJ of the second digit on the right foot along with a contracture at the second MPJ.  Remaining digits have semirigid contracture.    -Based on the above, we reviewed etiology of hammertoes and discussed treatment options including over-the-counter gel sleeves to reduce dorsal pressure.  I encouraged her to avoid any callus removing topical products as this can increase scar tissue formation and cause blistering.    -Also reviewed surgical treatment options including arthroplasty of the digits with release of the second MPJ.  We reviewed postoperative course and risks including but not limited to infection, recurrence and need for additional surgical intervention.    -Patient reports that she would like to avoid surgery over the summer months and will consider surgical treatment in the fall.  We will obtain weightbearing x-rays of the right foot when the patient returns for surgical consultation.    -Patient's questions invited and answered.  She was encouraged to call my office with any further questions or concerns.     30 minutes spent on the day of encounter doing chart review, history and exam, documentation, and further activities as noted.     Juvenal Berkowitz DPM  [facility] Podiatry/Foot & Ankle Surgery      HPI: I was asked to see Benita Hogue today by Dr. Riojas for pain in the second digit right foot.  Patient reports that she developed a cyst along the dorsal second digit on the right foot which was drained recently with her primary provider.  She reports continued to have discomfort with ambulation and would like to discuss treatment options.  She has tried over-the-counter gel sleeves with minimal relief.  Past medical history is  noncontributory.    No past medical history on file.      Social History     Socioeconomic History    Marital status:      Spouse name: Not on file    Number of children: Not on file    Years of education: Not on file    Highest education level: Not on file   Occupational History    Not on file   Tobacco Use    Smoking status: Never     Passive exposure: Never    Smokeless tobacco: Never   Vaping Use    Vaping Use: Never used   Substance and Sexual Activity    Alcohol use: No    Drug use: No    Sexual activity: Yes   Other Topics Concern    Not on file   Social History Narrative    Has grand kids. Likes to read. Works with elderly as LPN.     Social Determinants of Health     Financial Resource Strain: Not on file   Food Insecurity: Not on file   Transportation Needs: Not on file   Physical Activity: Not on file   Stress: Not on file   Social Connections: Not on file   Interpersonal Safety: Low Risk  (3/25/2024)    Interpersonal Safety     Do you feel physically and emotionally safe where you currently live?: Yes     Within the past 12 months, have you been hit, slapped, kicked or otherwise physically hurt by someone?: No     Within the past 12 months, have you been humiliated or emotionally abused in other ways by your partner or ex-partner?: No   Housing Stability: Not on file          No Known Allergies      MEDICATIONS:   Current Outpatient Medications   Medication Sig Dispense Refill    CALCIUM-MAGNESIUM-ZINC ORAL [CALCIUM-MAGNESIUM-ZINC ORAL] Take by mouth.      ESTRIOL 1MG/GRAM CREAM Apply 0.5 g topically 2 times daily 30 g 1    POTASSIUM CHLORIDE PO       acetaminophen (TYLENOL ORAL) [ACETAMINOPHEN (TYLENOL ORAL)] Take by mouth. (Patient not taking: Reported on 3/25/2024)      ibuprofen (ADVIL) 200 MG tablet [IBUPROFEN (ADVIL) 200 MG TABLET] Take 200 mg by mouth every 6 (six) hours as needed for pain. (Patient not taking: Reported on 3/25/2024)       No current facility-administered medications for  this visit.        Family History   Problem Relation Age of Onset    Heart Disease Mother     Cerebrovascular Disease Father     Breast Cancer Maternal Aunt           Review of Systems - 10 point Review of Systems is negative except for hammertoes which is noted in HPI.    OBJECTIVE:  Appearance: alert, well appearing, and in no distress.    VITAL SIGNS: Pulse 74   SpO2 96%       General appearance: Patient is alert and fully cooperative with history & exam.  No sign of distress is noted during the visit.     Psychiatric: Affect is pleasant & appropriate.  Patient appears motivated to improve health.     Respiratory: Breathing is regular & unlabored while sitting.     HEENT: Hearing is intact to spoken word.  Speech is clear.  No gross evidence of visual impairment that would impact ambulation.      Vascular: Dorsalis pedis and posterior tibial pulses are palpable. There is pedal hair growth bilateral.  CFT < 3 sec from anterior tibial surface to distal digits bilateral. There is no appreciable edema noted.  Dermatologic: Turgor and texture are within normal limits. No coloration or temperature changes. No primary or secondary lesions noted.  Neurologic: All epicritic and proprioceptive sensations are grossly intact bilateral.  Musculoskeletal: Rigid contracture PIPJ and DIPJ second digit right foot.  Semirigid contracture digits 3 through 5 right foot.  Contracture also identified and second MPJ right foot.

## 2024-04-17 ENCOUNTER — TELEPHONE (OUTPATIENT)
Dept: VASCULAR SURGERY | Facility: CLINIC | Age: 74
End: 2024-04-17
Payer: COMMERCIAL

## 2024-04-17 DIAGNOSIS — M20.41 HAMMERTOE OF RIGHT FOOT: Primary | ICD-10-CM

## 2024-04-17 NOTE — TELEPHONE ENCOUNTER
Spoke w/patient. She has tried using the gel toe caps, but feels that they add too much bulk inside her shoe and cause too much pressure. She would like to know what to do about the blister on her toe as well as who can remove a ring from the toe. Advised her that some primary clinics, most walk in clinics, urgent cares and ED's have ring cutters and to check with some of them. Also, recommended that she schedule an appointment to follow up with Dr Berkowitz to discuss her questions/concerns. Patient verbalized understanding and appointment has been scheduled by writer.

## 2024-04-17 NOTE — TELEPHONE ENCOUNTER
Caller: Benita    Provider: KATHLEEN Berkowitz    Detailed reason for call: She called in follow up to her 04/09/24 visit. The blister on right second toe continues to be an issue. She feels that there may be a corn under the blister. Is this possible? There is a tiny little black spot.     Also, can you advise on who can cut the ring off this toe. She feels this is part of the problem but she can't get it off.    She is interested in getting x-ray that was discussed. This was advised if she wanted to proceed with hammertoe surgery but she asked if the xray could shed more light on what is under the issue.    Please advise and we can get her scheduled for a follow.    Best phone number to contact: [phone]    Best time to contact: any    Ok to leave a detailed message: Yes

## 2024-07-13 ENCOUNTER — APPOINTMENT (OUTPATIENT)
Dept: CT IMAGING | Facility: HOSPITAL | Age: 74
End: 2024-07-13
Attending: PHYSICIAN ASSISTANT
Payer: COMMERCIAL

## 2024-07-13 ENCOUNTER — APPOINTMENT (OUTPATIENT)
Dept: RADIOLOGY | Facility: HOSPITAL | Age: 74
End: 2024-07-13
Attending: PHYSICIAN ASSISTANT
Payer: COMMERCIAL

## 2024-07-13 ENCOUNTER — HOSPITAL ENCOUNTER (EMERGENCY)
Facility: HOSPITAL | Age: 74
Discharge: HOME | End: 2024-07-13
Admitting: PHYSICIAN ASSISTANT
Payer: COMMERCIAL

## 2024-07-13 VITALS
BODY MASS INDEX: 23.57 KG/M2 | HEIGHT: 68 IN | TEMPERATURE: 98.1 F | SYSTOLIC BLOOD PRESSURE: 137 MMHG | RESPIRATION RATE: 16 BRPM | DIASTOLIC BLOOD PRESSURE: 65 MMHG | WEIGHT: 155.5 LBS | HEART RATE: 61 BPM | OXYGEN SATURATION: 98 %
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
## 2024-07-13 DIAGNOSIS — S00.83XA FACIAL CONTUSION, INITIAL ENCOUNTER: ICD-10-CM

## 2024-07-13 DIAGNOSIS — R07.89 CHEST WALL PAIN: ICD-10-CM

## 2024-07-13 DIAGNOSIS — T07.XXXA MULTIPLE ABRASIONS: ICD-10-CM

## 2024-07-13 DIAGNOSIS — S51.012A SKIN TEAR OF LEFT ELBOW WITHOUT COMPLICATION, INITIAL ENCOUNTER: ICD-10-CM

## 2024-07-13 PROCEDURE — 70486 CT MAXILLOFACIAL W/O DYE: CPT

## 2024-07-13 PROCEDURE — 250N000011 HC RX IP 250 OP 636: Performed by: PHYSICIAN ASSISTANT

## 2024-07-13 PROCEDURE — 93005 ELECTROCARDIOGRAM TRACING: CPT | Performed by: PHYSICIAN ASSISTANT

## 2024-07-13 PROCEDURE — 250N000013 HC RX MED GY IP 250 OP 250 PS 637: Performed by: PHYSICIAN ASSISTANT

## 2024-07-13 PROCEDURE — 90471 IMMUNIZATION ADMIN: CPT | Performed by: PHYSICIAN ASSISTANT

## 2024-07-13 PROCEDURE — 90714 TD VACC NO PRESV 7 YRS+ IM: CPT | Performed by: PHYSICIAN ASSISTANT

## 2024-07-13 PROCEDURE — 71046 X-RAY EXAM CHEST 2 VIEWS: CPT

## 2024-07-13 PROCEDURE — 99285 EMERGENCY DEPT VISIT HI MDM: CPT | Mod: 25

## 2024-07-13 RX ORDER — IBUPROFEN 600 MG/1
600 TABLET, FILM COATED ORAL ONCE
Status: COMPLETED | OUTPATIENT
Start: 2024-07-13 | End: 2024-07-13

## 2024-07-13 RX ADMIN — CLOSTRIDIUM TETANI TOXOID ANTIGEN (FORMALDEHYDE INACTIVATED) AND CORYNEBACTERIUM DIPHTHERIAE TOXOID ANTIGEN (FORMALDEHYDE INACTIVATED) 0.5 ML: 5; 2 INJECTION, SUSPENSION INTRAMUSCULAR at 12:21

## 2024-07-13 RX ADMIN — IBUPROFEN 600 MG: 600 TABLET, FILM COATED ORAL at 12:20

## 2024-07-13 ASSESSMENT — COLUMBIA-SUICIDE SEVERITY RATING SCALE - C-SSRS
6. HAVE YOU EVER DONE ANYTHING, STARTED TO DO ANYTHING, OR PREPARED TO DO ANYTHING TO END YOUR LIFE?: NO
1. IN THE PAST MONTH, HAVE YOU WISHED YOU WERE DEAD OR WISHED YOU COULD GO TO SLEEP AND NOT WAKE UP?: NO
2. HAVE YOU ACTUALLY HAD ANY THOUGHTS OF KILLING YOURSELF IN THE PAST MONTH?: NO

## 2024-07-13 ASSESSMENT — ACTIVITIES OF DAILY LIVING (ADL)
ADLS_ACUITY_SCORE: 35
ADLS_ACUITY_SCORE: 35
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
## 2024-07-13 NOTE — DISCHARGE INSTRUCTIONS
As we discussed, the CT of your facial bones and the x-ray of your chest showed no concerning findings.  If at any time you develop headache, confusion, visual or speech changes, arm or leg weakness or numbness, vomiting, difficulty walking please return to the ER for further evaluation.     We have placed Steri-Strips over your left elbow skin tear which should gradually fall off on their own.  Please watch your wounds for any signs of infection including increased pain, redness, swelling, or puslike discharge and return to the ER if this develops.
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
## 2024-07-15 LAB
ATRIAL RATE - MUSE: 53 BPM
DIASTOLIC BLOOD PRESSURE - MUSE: NORMAL MMHG
INTERPRETATION ECG - MUSE: NORMAL
P AXIS - MUSE: 9 DEGREES
PR INTERVAL - MUSE: 184 MS
QRS DURATION - MUSE: 80 MS
QT - MUSE: 426 MS
QTC - MUSE: 399 MS
R AXIS - MUSE: 58 DEGREES
SYSTOLIC BLOOD PRESSURE - MUSE: NORMAL MMHG
T AXIS - MUSE: 53 DEGREES
VENTRICULAR RATE- MUSE: 53 BPM

## 2024-08-16 PROBLEM — N81.11 CYSTOCELE, MIDLINE: Status: ACTIVE | Noted: 2024-08-16

## 2024-08-16 PROBLEM — N81.2 UTEROVAGINAL PROLAPSE, INCOMPLETE: Status: ACTIVE | Noted: 2024-08-16

## 2024-08-16 PROBLEM — N94.10 DYSPAREUNIA IN FEMALE: Status: ACTIVE | Noted: 2024-08-16

## 2024-08-16 PROBLEM — N81.6 RECTOCELE: Status: ACTIVE | Noted: 2024-08-16

## 2024-09-12 ENCOUNTER — TELEPHONE (OUTPATIENT)
Dept: FAMILY MEDICINE | Facility: CLINIC | Age: 74
End: 2024-09-12

## 2024-09-12 NOTE — TELEPHONE ENCOUNTER
Order/Referral Request    Who is requesting: Patient is requesting lab draw a lipid panel     Orders being requested: Lipid panel     Reason service is needed/diagnosis: no diagnosis patient wants to know what here levels are its been awhile since she had one done.      When are orders needed by: 0917/2024    Has this been discussed with Provider: No    Does patient have a preference on a Group/Provider/Facility? [facility]     Does patient have an appointment scheduled?: Yes: 09/17/2024 at 7:30 am for lab draw.    Where to send orders: Place orders within Epic    Could we send this information to you in [employer] [place] or would you prefer to receive a phone call?:   Patient would prefer a phone call   Okay to leave a detailed message?: Yes at Cell number on file:    Telephone Information:   Mobile [phone]

## 2024-09-14 NOTE — TELEPHONE ENCOUNTER
I would like to order labs after speaking to her at her appointment. Please clarify what her appointment is for.  I am concerned that it is a 20 min annual wellness visit that was scheduled inappropriately.  Thanks.

## 2024-09-17 ENCOUNTER — APPOINTMENT (OUTPATIENT)
Dept: LAB | Facility: CLINIC | Age: 74
End: 2024-09-17
Payer: COMMERCIAL

## 2024-09-17 ENCOUNTER — OFFICE VISIT (OUTPATIENT)
Dept: FAMILY MEDICINE | Facility: CLINIC | Age: 74
End: 2024-09-17
Payer: COMMERCIAL

## 2024-09-17 VITALS
HEIGHT: 68 IN | BODY MASS INDEX: 22.73 KG/M2 | HEART RATE: 73 BPM | WEIGHT: 150 LBS | RESPIRATION RATE: 16 BRPM | OXYGEN SATURATION: 97 % | SYSTOLIC BLOOD PRESSURE: 106 MMHG | TEMPERATURE: 99.2 F | DIASTOLIC BLOOD PRESSURE: 64 MMHG

## 2024-09-17 DIAGNOSIS — F43.21 GRIEF: ICD-10-CM

## 2024-09-17 DIAGNOSIS — N64.4 BREAST PAIN, LEFT: Primary | ICD-10-CM

## 2024-09-17 DIAGNOSIS — E78.2 MIXED HYPERLIPIDEMIA: ICD-10-CM

## 2024-09-17 DIAGNOSIS — Z13.1 DIABETES MELLITUS SCREENING: ICD-10-CM

## 2024-09-17 PROCEDURE — 99214 OFFICE O/P EST MOD 30 MIN: CPT | Performed by: NURSE PRACTITIONER

## 2024-09-17 RX ORDER — AMPICILLIN TRIHYDRATE 250 MG
600 CAPSULE ORAL DAILY
COMMUNITY

## 2024-09-17 ASSESSMENT — ANXIETY QUESTIONNAIRES
2. NOT BEING ABLE TO STOP OR CONTROL WORRYING: NOT AT ALL
1. FEELING NERVOUS, ANXIOUS, OR ON EDGE: NOT AT ALL
3. WORRYING TOO MUCH ABOUT DIFFERENT THINGS: SEVERAL DAYS
5. BEING SO RESTLESS THAT IT IS HARD TO SIT STILL: NOT AT ALL
GAD7 TOTAL SCORE: 3
6. BECOMING EASILY ANNOYED OR IRRITABLE: NOT AT ALL
IF YOU CHECKED OFF ANY PROBLEMS ON THIS QUESTIONNAIRE, HOW DIFFICULT HAVE THESE PROBLEMS MADE IT FOR YOU TO DO YOUR WORK, TAKE CARE OF THINGS AT HOME, OR GET ALONG WITH OTHER PEOPLE: NOT DIFFICULT AT ALL
GAD7 TOTAL SCORE: 3
7. FEELING AFRAID AS IF SOMETHING AWFUL MIGHT HAPPEN: SEVERAL DAYS

## 2024-09-17 ASSESSMENT — PATIENT HEALTH QUESTIONNAIRE - PHQ9
5. POOR APPETITE OR OVEREATING: SEVERAL DAYS
SUM OF ALL RESPONSES TO PHQ QUESTIONS 1-9: 4

## 2024-09-17 ASSESSMENT — PAIN SCALES - GENERAL: PAINLEVEL: NO PAIN (0)

## 2024-09-17 NOTE — TELEPHONE ENCOUNTER
Attempted to contact pt no answer unable to leave message.Trying to see if she could come in earlier for her appointment 9/17/24 if it is an annual wellness visit due to this appointment being scheduled incorrectly this visit should be a 40 min visit.    Miriam Dawkins MA
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
## 2024-09-30 ENCOUNTER — HOSPITAL ENCOUNTER (OUTPATIENT)
Dept: MAMMOGRAPHY | Facility: CLINIC | Age: 74
Discharge: HOME | End: 2024-09-30
Attending: NURSE PRACTITIONER
Payer: COMMERCIAL

## 2024-09-30 ENCOUNTER — ANCILLARY ORDERS (OUTPATIENT)
Dept: RADIOLOGY | Facility: CLINIC | Age: 74
End: 2024-09-30

## 2024-09-30 DIAGNOSIS — N64.4 BREAST PAIN, LEFT: ICD-10-CM

## 2024-09-30 PROCEDURE — 77066 DX MAMMO INCL CAD BI: CPT

## 2024-09-30 PROCEDURE — 76642 ULTRASOUND BREAST LIMITED: CPT | Mod: LT

## 2025-01-21 ENCOUNTER — DOCUMENTATION ONLY (OUTPATIENT)
Dept: OTHER | Facility: CLINIC | Age: 75
End: 2025-01-21

## 2025-01-21 ENCOUNTER — OFFICE VISIT (OUTPATIENT)
Dept: FAMILY MEDICINE | Facility: CLINIC | Age: 75
End: 2025-01-21
Payer: COMMERCIAL

## 2025-01-21 VITALS
HEART RATE: 56 BPM | DIASTOLIC BLOOD PRESSURE: 56 MMHG | WEIGHT: 143.5 LBS | RESPIRATION RATE: 20 BRPM | BODY MASS INDEX: 21.82 KG/M2 | OXYGEN SATURATION: 97 % | SYSTOLIC BLOOD PRESSURE: 116 MMHG | TEMPERATURE: 97.9 F

## 2025-01-21 DIAGNOSIS — G47.09 OTHER INSOMNIA: Primary | ICD-10-CM

## 2025-01-21 DIAGNOSIS — M20.41 HAMMERTOE OF RIGHT FOOT: ICD-10-CM

## 2025-01-21 PROCEDURE — 99213 OFFICE O/P EST LOW 20 MIN: CPT

## 2025-01-21 RX ORDER — TRAZODONE HYDROCHLORIDE 50 MG/1
25 TABLET, FILM COATED ORAL AT BEDTIME
Qty: 30 TABLET | Refills: 1 | Status: SHIPPED | OUTPATIENT
Start: 2025-01-21

## 2025-01-21 ASSESSMENT — PAIN SCALES - GENERAL: PAINLEVEL_OUTOF10: NO PAIN (0)

## 2025-01-21 NOTE — PATIENT INSTRUCTIONS
You previously saw Dr. Juvenal Berkowitz with podiatry, when you schedule your appointment, just make sure it is with someone else.     For sleeping, take trazodone when you go to bed. Start with 1 half tablet tonight, you can increase to 2 half tablets if needed for total of 50mg. You can safely take up to 100mg for sleep.     Follow up if you are not liking the trazodone.

## 2025-01-21 NOTE — PROGRESS NOTES
Assessment & Plan     Other insomnia  Do not recommend starting a benzodiazapine or Ambien at this time. Will have her trial low dose trazodone at initiation of sleep. Can increase to full tablet if needed. Follow up if no improvement.   - traZODone (DESYREL) 50 MG tablet; Take 0.5 tablets (25 mg) by mouth at bedtime.    Karly of right foot  Referral placed for 2nd opinion of her 2nd right metatarsal karly  - Orthopedic  Referral; Future            Subjective   Benita is a 74 year old, presenting for the following health issues:  Sleep Problem (Would like medication as needed for sleep when traveling. Trying benadryl /Lost  two months ago. /) and Toe Pain (Right side second toe. Was told from a podiatrist and Hammer toe, sec opinion if need surgery. )      1/21/2025     8:25 AM   Additional Questions   Roomed by SIMONE Murillo   Accompanied by Self     HPI      passed away about 2 months ago- 53 years  together. Esophageal cancer    Has been having issues sleeping.   She has been taking benadryl nightly to sleep. Has tried melatonin without any success  She has difficulty falling asleep  Her goal is to not be dependent on medication for sleep, but is really wanting to not have to worry about sleeping right now    Has good support in her life  5 children  Attending grief support group    Right 2nd toe karly  Saw podiatry last April, was recommended surgery  Has now developed a corn on her toe  Would like 2nd opinion    Review of Systems  Detailed as above      Objective    /56   Pulse 56   Temp 97.9  F (36.6  C) (Oral)   Resp 20   Wt 65.1 kg (143 lb 8 oz)   SpO2 97%   BMI 21.82 kg/m    Body mass index is 21.82 kg/m .  Physical Exam   GENERAL: alert and no distress  RESP: lungs clear to auscultation - no rales, rhonchi or wheezes  CV: regular rate and rhythm, normal S1 S2, no S3 or S4, no murmur, click or rub, no peripheral edema  MS: 2nd right metatarsal deformity  of toe, minimal tenderness            Signed Electronically by: SHAN Lopez CNP

## 2025-01-22 ENCOUNTER — PATIENT OUTREACH (OUTPATIENT)
Dept: CARE COORDINATION | Facility: CLINIC | Age: 75
End: 2025-01-22
Payer: COMMERCIAL

## 2025-02-27 ENCOUNTER — OFFICE VISIT (OUTPATIENT)
Dept: PODIATRY | Facility: CLINIC | Age: 75
End: 2025-02-27
Payer: COMMERCIAL

## 2025-02-27 ENCOUNTER — ANCILLARY PROCEDURE (OUTPATIENT)
Dept: GENERAL RADIOLOGY | Facility: CLINIC | Age: 75
End: 2025-02-27
Attending: PODIATRIST
Payer: COMMERCIAL

## 2025-02-27 DIAGNOSIS — M20.41 HAMMERTOE OF SECOND TOE OF RIGHT FOOT: ICD-10-CM

## 2025-02-27 DIAGNOSIS — M20.11 HALLUX VALGUS, RIGHT: Primary | ICD-10-CM

## 2025-02-27 PROCEDURE — 99214 OFFICE O/P EST MOD 30 MIN: CPT | Performed by: PODIATRIST

## 2025-02-27 PROCEDURE — 73630 X-RAY EXAM OF FOOT: CPT | Mod: TC | Performed by: RADIOLOGY

## 2025-02-27 NOTE — PROGRESS NOTES
PATIENT HISTORY:  Benita Hogue is a 74 year old female who presents to clinic in consultation at the request of  Lyn Robledo C.N.P. with a chief complaint of hammertoe.  The patient is seen by themselves.  The patient relates the pain is primarily located around the right foot.  Seeking second opinion  The patient relates that the symptoms have been going on for several month(s).  The patient has previously tried different shoes with little relief.  Any previous notes and studies that pertain to the patient's condition were reviewed.    Pertinent medical, surgical and family history was reviewed in the [facility] chart.    Past Medical History: No past medical history on file.    Medications:   Current Outpatient Medications:     acetaminophen (TYLENOL ORAL), Take by mouth., Disp: , Rfl:     CALCIUM-MAGNESIUM-ZINC ORAL, [CALCIUM-MAGNESIUM-ZINC ORAL] Take by mouth., Disp: , Rfl:     ibuprofen (ADVIL) 200 MG tablet, Take 200 mg by mouth every 6 hours as needed., Disp: , Rfl:     MAGNESIUM PO, Magnesium, Disp: , Rfl:     POTASSIUM CHLORIDE PO, , Disp: , Rfl:     red yeast rice 600 MG CAPS, Take 600 mg by mouth daily., Disp: , Rfl:     traZODone (DESYREL) 50 MG tablet, Take 0.5 tablets (25 mg) by mouth at bedtime., Disp: 30 tablet, Rfl: 1     Allergies:  No Known Allergies    Vitals: There were no vitals taken for this visit.  BMI= There is no height or weight on file to calculate BMI.    LOWER EXTREMITY PHYSICAL EXAM    Dermatologic: Skin is intact to {RIGHT /LEFT:373378} lower extremity without significant lesions, rash or abrasion.        Vascular: DP & PT pulses are intact & regular on the {RIGHT /LEFT:875034}.   CFT and skin temperature is normal to the {RIGHT /LEFT:903426} lower extremity.     Neurologic: Lower extremity sensation is intact to light touch.  No evidence of weakness in the {RIGHT /LEFT:612609} lower extremity.        Musculoskeletal: Patient is ambulatory without assistive device or  brace.  No gross ankle deformity noted.  No foot or ankle joint effusion is noted.  Noted ***    Diagnostics:  Radiographs included three views of the {RIGHT LEFT BOTH NO:109329} foot demonstrating *** no cortical erosions or periosteal elevation.  All joint margins appear stable.  There is no apparent fracture or tumor formation noted.  There is no evidence of foreign body.  The images were independently reviewed by myself along with the patient explaining the findings.      ASSESSMENT / PLAN:     ICD-10-CM    1. Lionelertojono, bilateral  M20.41     M20.42           I have explained to Benita about the conditions.  We discussed the underlying contributing factors to the condition as well as both conservative and surgical treatment options along with expected length of recovery.  At this time, ***    Benita verbalized agreement with and understanding of the rational for the diagnosis and treatment plan.  All questions were answered to best of my ability and the patient's satisfaction. The patient was advised to contact the clinic with any questions that may arise after the clinic visit.      Disclaimer: This note consists of symbols derived from keyboarding, dictation and/or voice recognition software. As a result, there may be errors in the script that have gone undetected. Please consider this when interpreting information found in this chart.       NILSON Collier D.P.M., F.SUMMER.C.F.A.S.     Lower extremity sensation is intact to light touch.  No evidence of weakness or contracture in the right lower extremities.        Musculoskeletal: Patient is ambulatory without assistive device or brace.  No gross ankle deformity noted.  No foot or ankle joint effusion is noted.   One notes a moderate bunion deformity with pain on palpation over the medial aspect of the first metatarsophalangeal joint on the right foot.  Noted positive tracking of the first metatarsophalangeal joint with noted pain on the right foot.   Noted pain on palpation over the second toe hammertoe deformity on the right foot.  No surrounding erythema or edema noted.     Diagnostics:  Radiographs were evaluated including AP, lateral and medial oblique views of the right foot reveals a moderate bunion deformity with an elevated first intermetatarsal angle of approximately 16 degrees and hallux abductus angle of approximately 32 degrees.  Noted hammertoe deformity of the second toe .  With contracture at the proximal interphalangeal joint no cortical erosions or periosteal elevation.  All joint margins appear stable.  There is no apparent fracture or tumor formation noted.  There is no evidence of foreign body.      ASSESSMENT / PLAN:     ICD-10-CM    1. Hallux valgus, right  M20.11 Case Request: 1.  Hohmann/Dev osteotomy bunionectomy, right foot  2.  Second toe hammertoe correction, right foot.      2. Hammertoe of second toe of right foot  M20.41 Case Request: 1.  Hohmann/Dev osteotomy bunionectomy, right foot  2.  Second toe hammertoe correction, right foot.          The nature of the patient s condition was discussed at length.  The patient wishes to proceed with surgical correction of the bunion deformity on the right foot.  I discussed with the patient details of procedure(s), possible risks and complications, alternative treatment options and post-operative course detailed below.  Patient is aware that surgery is elective and can be  avoided if desired.  Likely surgical procedures include a minimally invasive osteotomy bunionectomy along with hammertoe correction of the second toe on the right foot.  The patient was educated today about risks associated with surgery.  Risks of surgery include, but are not limited to: infection, painful scar, nerve injury, numbness, stiffness, over-correction, under-correction, non-union, need for repeat surgery, recurrence of condition, ongoing pain, delayed wound healing, blood clot in the legs or lungs, amputation or other unforeseen side effects from undergoing surgery.       The patient was informed that metal screws and occasional  metal plates are used to stabilize the fractures and or osteotomies.  The hardware typically remains in the foot unless it becomes bothersome to the patient.  If this happens the hardware may need to be removed.  The patient was instructed to not smoke or use nicotine patches before and after the surgery as this could result in poor outcomes due to slower healing potentially.  Risks of DVT/PE were discussed in relation to anticipated level of immobilization, inactivity, injury, surgery, medications and personal risk factors.  Perioperative education was provided regarding signs and symptoms of a blood clot.  The patient was encouraged to be vigilant regarding symptoms and pursue risk reduction measures.  Based on patient history, procedure and post-operative plan, risk outweighs benefit of chemical prophylaxis therefore mechanical prophylaxis was encouraged.  Lower extremity range of motion exercises are encouraged.   Postoperative pain regimens were discussed in great detail the patient.  The risks and benefits of non-narcotic and narcotic pain medications, as well as synergistic agents was also discussed.  The patient will be prescribed Oxycodone for more severe breakthrough pain not relieved by scheduled Tylenol.  The patient was also encouraged to rest, elevate and ice  postoperatively to help with swelling and pain control.  The patient was in agreement with this plan.  The patient understands that they would need to remain non weightbearing with use of crutches or knee scooter post-operatively.The recovery process was discussed including impact to work, walking, shoes and daily activities.  We discussed that the patient should anticipate up to 12 months for maximum recovery after surgery.  There is moderate risk involved.        After detailed discussed patient wishes to continue with the proposed surgical intervention.  The procedure will be performed under monitored anesthesia care with a local block for anesthesia.  The patient will obtain a preoperative history and physical by the primary care provider.  Consents will be reviewed and signed on the day of surgery.    Benita verbalized agreement with and understanding of the rational for the diagnosis and treatment plan.  All questions were answered to best of my ability and the patient's satisfaction. The patient was advised to contact the clinic with any questions that may arise after the clinic visit.      Disclaimer: This note consists of symbols derived from keyboarding, dictation and/or voice recognition software. As a result, there may be errors in the script that have gone undetected. Please consider this when interpreting information found in this chart.       NILSON Collier D.P.M., FDEYA.F.A.S.

## 2025-02-27 NOTE — NURSING NOTE
"Chief Complaint   Patient presents with    Consult     Second opinion - chin right foot       Initial There were no vitals taken for this visit. Estimated body mass index is 21.82 kg/m  as calculated from the following:    Height as of 9/17/24: 1.727 m (5' 8\").    Weight as of 1/21/25: 65.1 kg (143 lb 8 oz).  Medications and allergies reviewed.      Savita HESS MA    "

## 2025-02-27 NOTE — LETTER
2/27/2025      Benita Hogue  [address]  [place] [address]      Dear Colleague,    Thank you for referring your patient, Benita Hogue, to the [facility] [place]. Please see a copy of my visit note below.    PATIENT HISTORY:  Benita Hogue is a 74 year old female who presents to clinic in consultation at the request of  Lyn Robledo C.N.P. with a chief complaint of hammertoe.  The patient is seen by themselves.  The patient relates the pain is primarily located around the right foot.  Seeking second opinion  The patient relates that the symptoms have been going on for several month(s).  The patient has previously tried different shoes with little relief.  Any previous notes and studies that pertain to the patient's condition were reviewed.    Pertinent medical, surgical and family history was reviewed in the Epic chart.    Past Medical History: No past medical history on file.    Medications:   Current Outpatient Medications:      acetaminophen (TYLENOL ORAL), Take by mouth., Disp: , Rfl:      CALCIUM-MAGNESIUM-ZINC ORAL, [CALCIUM-MAGNESIUM-ZINC ORAL] Take by mouth., Disp: , Rfl:      ibuprofen (ADVIL) 200 MG tablet, Take 200 mg by mouth every 6 hours as needed., Disp: , Rfl:      MAGNESIUM PO, Magnesium, Disp: , Rfl:      POTASSIUM CHLORIDE PO, , Disp: , Rfl:      red yeast rice 600 MG CAPS, Take 600 mg by mouth daily., Disp: , Rfl:      traZODone (DESYREL) 50 MG tablet, Take 0.5 tablets (25 mg) by mouth at bedtime., Disp: 30 tablet, Rfl: 1     Allergies:  No Known Allergies    Vitals: There were no vitals taken for this visit.  BMI= There is no height or weight on file to calculate BMI.    LOWER EXTREMITY PHYSICAL EXAM    Dermatologic: Skin is intact to {RIGHT /LEFT:728780} lower extremity without significant lesions, rash or abrasion.        Vascular: DP & PT pulses are intact & regular on the {RIGHT /LEFT:449798}.   CFT and skin temperature is  normal to the {RIGHT /LEFT:800855} lower extremity.     Neurologic: Lower extremity sensation is intact to light touch.  No evidence of weakness in the {RIGHT /LEFT:259318} lower extremity.        Musculoskeletal: Patient is ambulatory without assistive device or brace.  No gross ankle deformity noted.  No foot or ankle joint effusion is noted.  Noted ***    Diagnostics:  Radiographs included three views of the {RIGHT LEFT BOTH NO:508344} foot demonstrating *** no cortical erosions or periosteal elevation.  All joint margins appear stable.  There is no apparent fracture or tumor formation noted.  There is no evidence of foreign body.  The images were independently reviewed by myself along with the patient explaining the findings.      ASSESSMENT / PLAN:     ICD-10-CM    1. Lionelertoe, bilateral  M20.41     M20.42           I have explained to Benita about the conditions.  We discussed the underlying contributing factors to the condition as well as both conservative and surgical treatment options along with expected length of recovery.  At this time, ***    Benita verbalized agreement with and understanding of the rational for the diagnosis and treatment plan.  All questions were answered to best of my ability and the patient's satisfaction. The patient was advised to contact the clinic with any questions that may arise after the clinic visit.      Disclaimer: This note consists of symbols derived from keyboarding, dictation and/or voice recognition software. As a result, there may be errors in the script that have gone undetected. Please consider this when interpreting information found in this chart.       NILSON Collier D.P.M., F.A.C.F.A.S.      Again, thank you for allowing me to participate in the care of your patient.        Sincerely,        Harjinder Collier DPM    Electronically signed

## 2025-02-27 NOTE — PATIENT INSTRUCTIONS
Surgery Scheduling   You have elected to proceed with surgery for a minimally invasive osteotomy bunionectomy and second toe hammertoe correction of the right foot.  Dr. Collier performs his surgeries on Tuesdays at [facility].   To schedule your surgery date please call [phone].  If no one answers, please leave a message with a good time for our staff to call you back.    - Please have a date in mind for your surgery, you can feel free to leave that date on the message, and we will schedule and call back to confirm.   - You can expect to receive a call back the same day or on the next business day from Dr. Collier s team to assist in the scheduling.   We will assist to schedule the date of your surgery.    The time will be determined a few days ahead of time.    You can expect a call from Same Day Surgery 2-3 days ahead of time with specific instructions for what time to arrive at the hospital as well as any other preparations you should take prior to surgery.  You may need to obtain a pre-operative physical from a primary medical provider.    This must be done within 30 days of your surgery date.  We will also schedule your first post-operative appointment for a bandage and wound check for the Monday following your surgery at the [place] location.  You may be non-weight bearing for a period of up to 6 weeks.  Options for this include: (Please indicate which you would prefer so we can provide you with an order and instructions)  Crutches  Walker  Roll-a-bout knee walker.  If you will need paperwork filled out for your employer you may drop those off at the clinic directly or you may have those faxed to us at [phone].  Please indicate on the form the date you would like the LA to begin if it will not be your surgery date.    The forms are typically filled out for up to 12 weeks, however you may be cleared to return prior to that time depending on your individual healing and job  requirements.    GETTING READY FOR YOUR SURGERY    ONE-THREE WEEKS BEFORE  1. See your Family Doctor or Primary Care Provider for a Preoperative History and Physical.    2. Please see pre-surgical medications below to which medications need to be stopped before surgery and when.    SAME DAY SURGERY PATIENTS  1. You will need a family member of friend to drive you home. If you do not have one the surgery will be cancelled or rescheduled.  2. You will need a responsible adult to stay with you that night after the surgery.       We will ask this person to listen to some instructions before you leave the hospital.    DAY BEFORE SURGERY  1. DO NOT EAT OR DRINK ANYTHING AFTER MIDNIGHT THE NIGHT  BEFORE YOUR SURGERY!   2. DO NOT DRINK ALCOHOL.  3. Do not take over the counter drugs.  4. Some people need to have blood tests at the hospital. If you need blood tests, we will tell you in advance.  5. Take medications as directed by your doctor. You may take these with a small amount of water.  6. Do not chew gum, chew tobacco, or suck on hard candy the day of surgery.  7. Bring your insurance cards, a list of your medicines and co-pays you might need. Leave jewelry and other valuables at home.      PRESURGICAL MEDICATIONS:  Certain prescription, over-the-counter, and herbal medications interfere with healing after an operation. The main concern relates to medications that increase bleeding at the surgical site. Excess blood under the incision results in poor wound healing, excess pain, increased scarring, and a higher risk of infection.    Some medications slow the healing process of bone. Medications can also interfere with the anesthesia drugs that keep you asleep during the operation. It is important to ensure that these medications are out of your system prior to the operation. The list below details a number of medications that are of concern. Pay special attention to how long you should avoid these medications before  your operation. Please note that this list is not complete. You should ask your surgeon or pharmacist if you are uncertain about other medications. Any herbal supplement not listed should be discontinued at least one week prior to surgery.    Ozempic/Trulicity/Mounjaro/Rybelsus: no injections one week prior to surgery.  These medications slow the emptying of contents of your stomach which receive anesthesia without risk of aspiration.    Aspirin: Hold for one week prior to surgery and restart the day after surgery. This over the counter medication promotes bleeding.    Motrin / Ibuprofen / Aleve / Advil / NSAIDS:  Stop one week prior to surgery. These medications affect bleeding and may cause delay in bone healing. Avoid taking these medications for six weeks after bone surgeries. Other procedures may allow you to restart sooner than 6 weeks after surgery.    Coumadin / Plavix: Your primary care provider will manage Coumadin in relation to surgery. Coumadin may result in excessive bleeding and may be adjusted before and after surgery.    Enbriel: Stop two weeks prior to surgery and restart two weeks after surgery. This medication can effect soft tissue healing and increases the risk of infection.    Remicade: Stop 8-12 weeks before surgery and restart two weeks after surgery. This medication can affect soft tissue healing and increases the risk of infection.    Humira: Stop 4 weeks before surgery and restart two weeks after surgery. This medication can affect soft tissue healing and increases the risk of infection.    Methotrexate: Stop one dose prior to surgery. This medication will be restarted when the wound appears to be healing well. Please ask your surgeon about restarting this medication when you are being seen in the office for wound checks.    Kava: Stop at least one day prior to surgery and may restart one day after surgery. Kava may increase the sedative effect of anesthetics that are given during the  operation. Kava can also increase bleeding at the surgical site.    Ephedra (ma schroeder): Stop at least one day prior to surgery and may restart one day after surgery.  Ephedra may increase the risk of heart attack and stroke. This medication can also increase bleeding at the surgical site.    Adenike's Wort: Stop at least five days before surgery and may restart one day after surgery. Adenike's wort may diminish the effects of several drugs that are given during surgery.    Ginseng: Stop at least one week prior to surgery and may restart one day after surgery.  Ginseng lowers blood sugar and may increase bleeding at the surgical site.    Ginkgo: Stop 36 hours before surgery and may restart one day after surgery. Ginkgo may increase bleeding at the surgical site.    Garlic: Stop at least one week prior to surgery and may restart one day after. Garlic may increase bleeding at the surgery site.    Valerian: Do a slow steady decrease in your daily dose over a period of 2-3 weeks before surgery to decrease the chance of withdrawal symptoms. Valerian may increase the sedative effect of anesthetics given during the operation.    Echinacea: There is no data on stopping echinacea prior to surgery. This medication though can be associated with allergic reactions and suppression of your immune system.    Vitamin E, Omega-3, Flax, Fish Oil, Glucosamine and Chondroitin: Stop 2 weeks prior to surgery and may restart one day after. These herbal medications can increase risk of bleeding at surgical site.

## 2025-03-24 ENCOUNTER — PATIENT OUTREACH (OUTPATIENT)
Dept: CARE COORDINATION | Facility: CLINIC | Age: 75
End: 2025-03-24

## 2025-03-24 ENCOUNTER — LAB (OUTPATIENT)
Dept: LAB | Facility: CLINIC | Age: 75
End: 2025-03-24
Payer: COMMERCIAL

## 2025-03-24 DIAGNOSIS — Z13.1 DIABETES MELLITUS SCREENING: ICD-10-CM

## 2025-03-24 DIAGNOSIS — E78.2 MIXED HYPERLIPIDEMIA: ICD-10-CM

## 2025-03-24 LAB
ALBUMIN SERPL BCG-MCNC: 4 G/DL (ref 3.5–5.2)
ALP SERPL-CCNC: 57 U/L (ref 40–150)
ALT SERPL W P-5'-P-CCNC: 16 U/L (ref 0–50)
ANION GAP SERPL CALCULATED.3IONS-SCNC: 12 MMOL/L (ref 7–15)
AST SERPL W P-5'-P-CCNC: 24 U/L (ref 0–45)
BILIRUB SERPL-MCNC: 0.3 MG/DL
BUN SERPL-MCNC: 13.8 MG/DL (ref 8–23)
CALCIUM SERPL-MCNC: 9.3 MG/DL (ref 8.8–10.4)
CHLORIDE SERPL-SCNC: 106 MMOL/L (ref 98–107)
CHOLEST SERPL-MCNC: 189 MG/DL
CREAT SERPL-MCNC: 0.94 MG/DL (ref 0.51–0.95)
EGFRCR SERPLBLD CKD-EPI 2021: 63 ML/MIN/1.73M2
EST. AVERAGE GLUCOSE BLD GHB EST-MCNC: 108 MG/DL
FASTING STATUS PATIENT QL REPORTED: YES
FASTING STATUS PATIENT QL REPORTED: YES
GLUCOSE SERPL-MCNC: 88 MG/DL (ref 70–99)
HBA1C MFR BLD: 5.4 % (ref 0–5.6)
HCO3 SERPL-SCNC: 24 MMOL/L (ref 22–29)
HDLC SERPL-MCNC: 89 MG/DL
LDLC SERPL CALC-MCNC: 86 MG/DL
NONHDLC SERPL-MCNC: 100 MG/DL
POTASSIUM SERPL-SCNC: 4.2 MMOL/L (ref 3.4–5.3)
PROT SERPL-MCNC: 6.4 G/DL (ref 6.4–8.3)
SODIUM SERPL-SCNC: 142 MMOL/L (ref 135–145)
TRIGL SERPL-MCNC: 70 MG/DL

## 2025-03-24 PROCEDURE — 36415 COLL VENOUS BLD VENIPUNCTURE: CPT

## 2025-03-24 PROCEDURE — 83036 HEMOGLOBIN GLYCOSYLATED A1C: CPT

## 2025-03-24 PROCEDURE — 80053 COMPREHEN METABOLIC PANEL: CPT

## 2025-03-24 PROCEDURE — 80061 LIPID PANEL: CPT

## 2025-04-07 ENCOUNTER — PATIENT OUTREACH (OUTPATIENT)
Dept: CARE COORDINATION | Facility: CLINIC | Age: 75
End: 2025-04-07
Payer: COMMERCIAL

## 2025-06-26 PROBLEM — M24.571 CONTRACTURE OF JOINTS OF BOTH ANKLE AND FOOT OF RIGHT LOWER EXTREMITY: Status: RESOLVED | Noted: 2024-04-09 | Resolved: 2025-06-26

## 2025-06-26 PROBLEM — H81.10 BPPV (BENIGN PAROXYSMAL POSITIONAL VERTIGO): Status: RESOLVED | Noted: 2017-04-19 | Resolved: 2025-06-26

## 2025-06-26 PROBLEM — M24.574 CONTRACTURE OF JOINTS OF BOTH ANKLE AND FOOT OF RIGHT LOWER EXTREMITY: Status: RESOLVED | Noted: 2024-04-09 | Resolved: 2025-06-26

## 2025-07-03 ENCOUNTER — OFFICE VISIT (OUTPATIENT)
Dept: FAMILY MEDICINE | Facility: CLINIC | Age: 75
End: 2025-07-03
Payer: COMMERCIAL

## 2025-07-03 ENCOUNTER — RESULTS FOLLOW-UP (OUTPATIENT)
Dept: FAMILY MEDICINE | Facility: CLINIC | Age: 75
End: 2025-07-03

## 2025-07-03 VITALS
WEIGHT: 143 LBS | DIASTOLIC BLOOD PRESSURE: 60 MMHG | HEART RATE: 68 BPM | SYSTOLIC BLOOD PRESSURE: 104 MMHG | OXYGEN SATURATION: 97 % | BODY MASS INDEX: 21.67 KG/M2 | TEMPERATURE: 98.3 F | HEIGHT: 68 IN | RESPIRATION RATE: 16 BRPM

## 2025-07-03 DIAGNOSIS — Z78.0 POSTMENOPAUSAL STATUS: ICD-10-CM

## 2025-07-03 DIAGNOSIS — Z12.11 COLON CANCER SCREENING: ICD-10-CM

## 2025-07-03 DIAGNOSIS — Z82.49 FAMILY HISTORY OF ISCHEMIC HEART DISEASE: ICD-10-CM

## 2025-07-03 DIAGNOSIS — E78.2 MIXED HYPERLIPIDEMIA: ICD-10-CM

## 2025-07-03 DIAGNOSIS — Z00.00 ENCOUNTER FOR MEDICARE ANNUAL WELLNESS EXAM: Primary | ICD-10-CM

## 2025-07-03 LAB
ALBUMIN UR-MCNC: NEGATIVE MG/DL
AMORPH CRY #/AREA URNS HPF: ABNORMAL /HPF
APPEARANCE UR: CLEAR
BACTERIA #/AREA URNS HPF: ABNORMAL /HPF
BILIRUB UR QL STRIP: NEGATIVE
COLOR UR AUTO: YELLOW
ERYTHROCYTE [DISTWIDTH] IN BLOOD BY AUTOMATED COUNT: 12.1 % (ref 10–15)
GLUCOSE UR STRIP-MCNC: NEGATIVE MG/DL
HCT VFR BLD AUTO: 42.4 % (ref 35–47)
HGB BLD-MCNC: 14.3 G/DL (ref 11.7–15.7)
HGB UR QL STRIP: NEGATIVE
KETONES UR STRIP-MCNC: NEGATIVE MG/DL
LEUKOCYTE ESTERASE UR QL STRIP: ABNORMAL
MCH RBC QN AUTO: 29.2 PG (ref 26.5–33)
MCHC RBC AUTO-ENTMCNC: 33.7 G/DL (ref 31.5–36.5)
MCV RBC AUTO: 87 FL (ref 78–100)
NITRATE UR QL: NEGATIVE
PH UR STRIP: 7 [PH] (ref 5–8)
PLATELET # BLD AUTO: 263 10E3/UL (ref 150–450)
RBC # BLD AUTO: 4.9 10E6/UL (ref 3.8–5.2)
RBC #/AREA URNS AUTO: ABNORMAL /HPF
SP GR UR STRIP: 1.02 (ref 1–1.03)
SQUAMOUS #/AREA URNS AUTO: ABNORMAL /LPF
TRANS CELLS #/AREA URNS HPF: ABNORMAL /HPF
TSH SERPL DL<=0.005 MIU/L-ACNC: 2.46 UIU/ML (ref 0.3–4.2)
UROBILINOGEN UR STRIP-ACNC: 0.2 E.U./DL
WBC # BLD AUTO: 4.7 10E3/UL (ref 4–11)
WBC #/AREA URNS AUTO: ABNORMAL /HPF

## 2025-07-03 SDOH — HEALTH STABILITY: PHYSICAL HEALTH: ON AVERAGE, HOW MANY DAYS PER WEEK DO YOU ENGAGE IN MODERATE TO STRENUOUS EXERCISE (LIKE A BRISK WALK)?: 6 DAYS

## 2025-07-03 ASSESSMENT — PAIN SCALES - GENERAL: PAINLEVEL_OUTOF10: NO PAIN (0)

## 2025-07-03 ASSESSMENT — SOCIAL DETERMINANTS OF HEALTH (SDOH): HOW OFTEN DO YOU GET TOGETHER WITH FRIENDS OR RELATIVES?: MORE THAN THREE TIMES A WEEK

## 2025-07-03 NOTE — PROGRESS NOTES
Preventive Care Visit  [facility]  SHAN Martinez CNP, Family Medicine  Jul 3, 2025      Assessment & Plan     Encounter for Medicare annual wellness exam  Recommend consuming a healthy diet and exercising.  She declines COVID-vaccine.  She will consider obtaining the RSV vaccine at the pharmacy.  Bone density scan, mammogram, colonoscopy ordered.  - CBC with platelets  - TSH with free T4 reflex  - UA Macroscopic with reflex to Microscopic and Culture    Family history of ischemic heart disease  Will obtain coronary calcium score for risk stratification.  - CT Coronary Calcium Scan    Colon cancer screening  - Colonoscopy Screening  Referral    Postmenopausal status  Recommend adequate calcium and vitamin D intake.  - DX Bone Density    Mixed hyperlipidemia  This is controlled.  She is taking red yeast rice.  Will obtain coronary calcium score for risk stratification.  Further plans pending the results.  - CT Coronary Calcium Scan    The longitudinal plan of care for the diagnosis(es)/condition(s) as documented were addressed during this visit. Due to the added complexity in care, I will continue to support Citlaly in the subsequent management and with ongoing continuity of care.      Reviewed preventive health counseling, as reflected in patient instructions  Counseling  Appropriate preventive services were addressed with this patient via screening, questionnaire, or discussion as appropriate for fall prevention, nutrition, physical activity, Tobacco-use cessation, social engagement, weight loss and cognition.  Checklist reviewing preventive services available has been given to the patient.  Reviewed patient's diet, addressing concerns and/or questions.           John Boucher is a 74 year old, presenting for the following:  Wellness Visit        7/3/2025     8:07 AM   Additional Questions   Roomed by Miriam         7/3/2025   Forms   Any forms needing to be completed Yes           HPI  Patient was  for 53 years.  Her  passed away November 21.  Patient has been seeing a grief counselor.  She has 5 children who are supportive.  Patient states she is sleeping well without medication.  Her sister takes an antidepressant and suggested this.  Patient is not interested in medication at this time.  She is consuming a healthy diet and walks and bikes for exercise.  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  She is considering home health care as another option.  She has a history of hyperlipidemia and is taking red yeast rice.  Last cholesterol check was on 3/24/2025 with a total cholesterol 189, triglycerides 70, HDL 89, LDL 86.  Her brother recently had triple bypass.  She is interested in a coronary calcium score.  She has a history of osteoporosis and is due for bone density scan.      Advance Care Planning    Patient states has Health Care Directive and will send to [employer].        7/3/2025   General Health   How would you rate your overall physical health? Good   Feel stress (tense, anxious, or unable to sleep) Not at all         7/3/2025   Nutrition   Diet: Regular (no restrictions)         7/3/2025   Exercise   Days per week of moderate/strenous exercise 6 days         7/3/2025   Social Factors   Frequency of gathering with friends or relatives More than three times a week   Worry food won't last until get money to buy more No   Food not last or not have enough money for food? No   Do you have housing? (Housing is defined as stable permanent housing and does not include staying outside in a car, in a tent, in an abandoned building, in an overnight shelter, or couch-surfing.) Yes   Are you worried about losing your housing? No   Lack of transportation? No   Unable to get utilities (heat,electricity)? No         7/3/2025   Fall Risk   Fallen 2 or more times in the past year? No   Trouble with walking or balance? No          7/3/2025   Activities of Daily  Living- Home Safety   Needs help with the following daily activites None of the above   Safety concerns in the home None of the above         7/3/2025   Dental   Dentist two times every year? Yes         7/3/2025   Hearing Screening   Hearing concerns? None of the above         7/3/2025   Driving Risk Screening   Patient/family members have concerns about driving No         7/3/2025   General Alertness/Fatigue Screening   Have you been more tired than usual lately? (!) DECLINE         7/3/2025   Urinary Incontinence Screening   Bothered by leaking urine in past 6 months No           Today's PHQ-2 Score:       1/21/2025     8:16 AM   PHQ-2 ( 1999 Pfizer)   Q1: Little interest or pleasure in doing things 1    Q2: Feeling down, depressed or hopeless 0    PHQ-2 Score 1    Q1: Little interest or pleasure in doing things Several days   Q2: Feeling down, depressed or hopeless Not at all   PHQ-2 Score 1       Proxy-reported         7/3/2025   Substance Use   Alcohol more than 3/day or more than 7/wk No   Do you have a current opioid prescription? No   How severe/bad is pain from 1 to 10? 0/10 (No Pain)   Do you use any other substances recreationally? No     Social History     Tobacco Use    Smoking status: Never     Passive exposure: Never    Smokeless tobacco: Never   Vaping Use    Vaping status: Never Used   Substance Use Topics    Alcohol use: No    Drug use: No           9/30/2024   LAST FHS-7 RESULTS   1st degree relative breast or ovarian cancer No   Any relative bilateral breast cancer No   Any male have breast cancer No   Any ONE woman have BOTH breast AND ovarian cancer No   Any woman with breast cancer before 50yrs No   2 or more relatives with breast AND/OR ovarian cancer Yes   2 or more relatives with breast AND/OR bowel cancer No        Mammogram Screening - After age 74- determine frequency with patient based on health status, life expectancy and patient goals    ASCVD Risk   The 10-year ASCVD risk  score (Ronak RAMIREZ, et al., 2019) is: 12.5%    Values used to calculate the score:      Age: 74 years      Sex: Female      Is Non- : No      Diabetic: No      Tobacco smoker: No      Systolic Blood Pressure: 104 mmHg      Is BP treated: No      HDL Cholesterol: 89 mg/dL      Total Cholesterol: 189 mg/dL          Reviewed and updated as needed this visit by Provider                    Past Medical History:   Diagnosis Date    BPPV (benign paroxysmal positional vertigo) 04/19/2017    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 No pertinent surgical history.  Current Outpatient Medications   Medication Sig Dispense Refill    acetaminophen (TYLENOL ORAL) Take by mouth.      calcium carbonate 750 MG CHEW Take 750 mg by mouth daily.      ibuprofen (ADVIL) 200 MG tablet Take 200 mg by mouth every 6 hours as needed.      MAGNESIUM PO Magnesium      POTASSIUM CHLORIDE PO       red yeast rice 600 MG CAPS Take 600 mg by mouth daily.      traZODone (DESYREL) 50 MG tablet Take 0.5 tablets (25 mg) by mouth at bedtime. 30 tablet 1     Current providers sharing in care for this patient include:  Patient Care Team:  Risa Bhakta APRN CNP as PCP - General (Family Medicine)  Risa Bhakta APRN CNP as Assigned PCP  Harjinder Collier DPM as Assigned Surgical Provider    The following health maintenance items are reviewed in Epic and correct as of today:  Health Maintenance   Topic Date Due    RSV VACCINE (1 - 1-dose 75+ series) Never done    COVID-19 VACCINE (4 - 2024-25 season) 09/01/2024    MEDICARE ANNUAL WELLNESS VISIT  04/23/2025    ANNUAL REVIEW OF HM ORDERS  09/17/2025    INFLUENZA VACCINE (1) 09/01/2025    FALL RISK ASSESSMENT  07/03/2026    DIABETES SCREENING  03/24/2028    ADVANCE CARE PLANNING  01/21/2030    LIPID  03/24/2030    DTAP/TDAP/TD VACCINE (6 - Td or Tdap) 07/13/2034    DEXA  09/29/2037    HEPATITIS C SCREENING  Completed     "PHQ-2 (once per calendar year)  Completed    PNEUMOCOCCAL VACCINE 50+ YEARS  Completed    ZOSTER VACCINE  Completed    HPV VACCINE  Aged Out    MENINGITIS VACCINE  Aged Out    MAMMO SCREENING  Discontinued    COLORECTAL CANCER SCREENING  Discontinued         Review of Systems  Constitutional, HEENT, cardiovascular, pulmonary, GI, , musculoskeletal, neuro, skin, endocrine and psych systems are negative, except as otherwise noted.     Objective    Exam  /60   Pulse 68   Temp 98.3  F (36.8  C)   Resp 16   Ht 1.727 m (5' 8\")   Wt 64.9 kg (143 lb)   SpO2 97%   Breastfeeding No   BMI 21.74 kg/m     Estimated body mass index is 21.74 kg/m  as calculated from the following:    Height as of this encounter: 1.727 m (5' 8\").    Weight as of this encounter: 64.9 kg (143 lb).    Physical Exam  GENERAL: alert and no distress  EYES: Eyes grossly normal to inspection, PERRL and conjunctivae and sclerae normal  HENT: ear canals and TM's normal, nose and mouth without ulcers or lesions  NECK: no adenopathy, no asymmetry, masses, or scars  RESP: lungs clear to auscultation - no rales, rhonchi or wheezes  CV: regular rate and rhythm, normal S1 S2, no S3 or S4, no murmur, click or rub, no peripheral edema  ABDOMEN: soft, nontender, no hepatosplenomegaly, no masses and bowel sounds normal  MS: no gross musculoskeletal defects noted, no edema  SKIN: no suspicious lesions or rashes  NEURO: Normal strength and tone, mentation intact and speech normal  PSYCH: mentation appears normal, affect normal/bright        7/3/2025   Mini Cog   Clock Draw Score 2 Normal   3 Item Recall 3 objects recalled   Mini Cog Total Score 5              Signed Electronically by: SHAN Martinez CNP    "